# Patient Record
Sex: MALE | Race: WHITE | NOT HISPANIC OR LATINO | Employment: OTHER | URBAN - METROPOLITAN AREA
[De-identification: names, ages, dates, MRNs, and addresses within clinical notes are randomized per-mention and may not be internally consistent; named-entity substitution may affect disease eponyms.]

---

## 2017-09-27 ENCOUNTER — ALLSCRIPTS OFFICE VISIT (OUTPATIENT)
Dept: OTHER | Facility: OTHER | Age: 72
End: 2017-09-27

## 2017-09-27 DIAGNOSIS — L98.9 DISORDER OF SKIN OR SUBCUTANEOUS TISSUE: ICD-10-CM

## 2017-10-27 NOTE — PROGRESS NOTES
Assessment  1  Skin lesion (709 9) (L98 9)   2  Seborrheic keratosis (702 19) (L82 1)   3  Screening for skin condition (V82 0) (Z13 89)   4  History of nonmelanoma skin cancer (V10 83) (Z85 828)    Plan   · Follow-up visit in 1 year Evaluation and Treatment  Follow-up  Status: Hold For -  Scheduling  Requested for: [id number]   · Use a sun block product with an SPF of 15 or more ; Status:Complete;   Done:  89YZH9326   · Wound care as instructed ; Status:Complete;   Done: 05BYT8957    Discussion/Summary  Discussion Summary- [facility]:   Assessment #1: seborrheic keratosis  Care Plan:   Patient reassured these are normal growths we acquire with age no treatment needed  Assessment #2: History skin cancer  Care Plan:   No recurrence nothing else atypical noted sunblock recommended followup in 1 year  Assessment #3: Screening for dermatologic disorders  Care Plan:   Nothing else noted on exam followup in 1 year  Assessment #4: Skin lesion  Care Plan:   Possible basal cell carcinoma we'll plan on complete excision of biopsy if possible  Chief Complaint  Chief Complaint Free Text Note Form: yearly full body skin cancer exam      History of Present Illness  HPI: 26-year-old male presents for overall skin check concern regarding previous history of skin cancer as well as a nonhealing lesion on his right lateral nasal wall      Review of Systems  Complete Male D[facility]at[facility] Patient:   Constitutional: Denies constitutional symptoms  Eyes: Denies eye symptoms  ENT:  denies ear symptoms, nasal symptoms, mouth or throat symptoms  Cardiovascular: Denies cardiovascular symptoms  Respiratory: Denies respiratory symptoms  Gastrointestinal: Denies gastrointestinal symptoms  Musculoskeletal: Denies musculoskeletal symptoms  Integumentary: Denies skin, hair and nail symptoms  Neurological: Denies neurologic symptoms  Psychiatric: Denies psychiatric symptoms     Endocrine: Denies endocrine symptoms  Hematologic/Lymphatic: Denies hematologic symptoms  Active Problems  1  Hypercholesterolemia (272 0) (E78 00)   2  Hypertension (401 9) (I10)   3  Inflamed seborrheic keratosis (702 11) (L82 0)   4  Screening for skin condition (V82 0) (Z13 89)   5  Seborrheic keratosis (702 19) (L82 1)   6  Seborrheic keratosis (702 19) (L82 1)   7  Skin lesion (709 9) (L98 9)    Past Medical History  1  History of Basal cell carcinoma of face (173 31) (C44 310)   2  History of benign neoplasm of skin (V13 3) (Z87 2)   3  History of nonmelanoma skin cancer (V10 83) (Z85 828)   4  History of Prostate Cancer (V10 46)   5  History of Skin Color & Pigmentation (709 00)  Past Medical History Reviewed- Derm:   The past medical history was reviewed  Surgical History  1  History of Excision Of Lesion Face Malignant   2  History of Excision Of Lesion Nose Malignant  Surgical History Reviewed [facility]- Derm:   Surgical History reviewed      Family History  Brother    1  Family history of Prostate cancer  Family History Reviewed- Derm:   Family History was reviewed      Social History   · Never A Smoker  Social History Reviewed [facility]- Derm: The social history was reviewed      Current Meds   1  MetFORMIN HCl TABS; Therapy: (Recorded:47Mhl2118) to Recorded   2  Multi-Vitamin TABS; Therapy: (Recorded:75Spt7615) to Recorded   3  Psyllium CAPS; Therapy: (Recorded:75Gvi4986) to Recorded   4  Simvastatin 5 MG Oral Tablet; TAKE 1 TABLET DAILY; Therapy: [id number] to Recorded  Medication List Reviewed: The medication list was reviewed and updated today  Allergies  1  Benicar   2  Sulfa Drugs    Physical Exam    Constitutional   General appearance: Appears healthy and well developed  Lymphatic   No visible disturbance  Musculoskeletal   Digits and nails: No clubbing, cyanosis or edema   Cutaneous and nail exam normal     Skin   Scalp skin texture and hair distribution: Normal skin texture on scalp, normal hair distribution  Head: Abnormal     Neck: Normal turgor, no rashes, no lesions  Chest: Normal turgor, no rashes, no lesions  Abdomen: Normal turgor, no rashes, no lesions  Back: Normal turgor, no rashes, no lesions  Right upper extremity: Normal turgor, no rashes, no lesions  Left upper extremity: Normal turgor, no rashes, no lesions  Right lower extremity: Normal turgor, no rashes, no lesions  Left lower extremity: Normal turgor, no rashes, no lesions  Neuro/Psych   Alert and oriented x 3  Displays comfort and cooperation during encounterl  Affect is normal     Finding 3mm keratotic papule R lateral nasal wallprevious sites of skin cancer well-healed without recurrence normal keratotic papules with greasy stuck on appearance nothing else atypical noted on complete exam       Procedure    Procedure: skin biopsy  Indications for the procedure include basal cell carcinoma suspected  Risks, benefits, alternatives, infection risk, bleeding risk, risk of allergic reaction and risk of scarring were discussed with the patient--   verbal consent was obtained prior to the procedure  Procedure Note:   Anesthesia: 1 ml of lidocaine 1% without epinephrine  The lesion was located on the Right lateral nasal wall  The patient was prepped using alcohol  a shave biopsy of the lesion was taken  The hemostasis of the wound was achieved with aluminum chloride  Dressing: The wound was cleaned and petroleum jelly was applied and a sterile dressing was placed  Specimen: the excised lesion was place in buffered formalin and sent for pathology  Post-Procedure:   Patient Status: the patient tolerated the procedure well  Complications: there were no complications  Follow-up in the office  patient will be called in event skin cancer is found  -- Patient can call the office in 7-10 days for results if not contacted        Signatures   Electronically signed by : GILMAR Chavez ; Sep 27 2017 11: 20AM EST                       (Author)

## 2017-11-21 ENCOUNTER — GENERIC CONVERSION - ENCOUNTER (OUTPATIENT)
Dept: OTHER | Facility: OTHER | Age: 72
End: 2017-11-21

## 2018-05-21 ENCOUNTER — OFFICE VISIT (OUTPATIENT)
Dept: DERMATOLOGY | Facility: CLINIC | Age: 73
End: 2018-05-21
Payer: MEDICARE

## 2018-05-21 DIAGNOSIS — L82.1 SEBORRHEIC KERATOSIS: ICD-10-CM

## 2018-05-21 DIAGNOSIS — Z13.89 SCREENING FOR SKIN CONDITION: Primary | ICD-10-CM

## 2018-05-21 DIAGNOSIS — Z85.828 HISTORY OF SKIN CANCER: ICD-10-CM

## 2018-05-21 PROCEDURE — 99213 OFFICE O/P EST LOW 20 MIN: CPT | Performed by: DERMATOLOGY

## 2018-05-21 RX ORDER — MULTIVITAMIN
TABLET ORAL
COMMUNITY

## 2018-05-21 RX ORDER — AMLODIPINE BESYLATE 2.5 MG/1
10 TABLET ORAL DAILY
COMMUNITY

## 2018-05-21 RX ORDER — SIMVASTATIN 5 MG
1 TABLET ORAL DAILY
COMMUNITY
Start: 2014-03-07

## 2018-05-21 NOTE — PROGRESS NOTES
[address] [facility] [address] [facility]  [address] [address]     MRN: [id number] : 1945  Encounter: [id number]  Patient Information: Corin Barnett  Chief complaint:Six-month check    History of present illness:  75-year-old male with previous history skin cancer presents for overall checkup no specific concerns noted  Past Medical History:   Diagnosis Date    Diabetes ([facility] [address] )     Hyperlipemia     Hypertension      History reviewed  No pertinent surgical history  Social History   History   Alcohol Use    1 2 oz/week    1 Glasses of wine, 1 Cans of beer per week     Comment: occasionally      History   Drug Use No     History   Smoking Status    Never Smoker   Smokeless Tobacco    Never Used     Family History   Problem Relation Age of Onset    Hypertension Mother     Diabetes Mother     Leukemia Father      Meds/Allergies   Allergies   Allergen Reactions    Bee Venom     Olmesartan     Sulfa Antibiotics        Meds:  Prior to Admission medications    Medication Sig Start Date End Date Taking?  Authorizing Provider   amLODIPine (NORVASC) 2 5 mg tablet Take 5 mg by mouth daily   Yes Historical Provider, MD   metFORMIN (GLUCOPHAGE) 1000 MG tablet Take by mouth   Yes Historical Provider, MD   Multiple Vitamin (MULTI-VITAMIN DAILY) TABS Take by mouth   Yes Historical Provider, MD   simvastatin (ZOCOR) 5 MG tablet Take 1 tablet by mouth daily 3/7/14  Yes Historical Provider, MD   Psyllium 400 MG CAPS Take by mouth    Historical Provider, MD       Subjective:     Review of Systems:    General: negative for - chills, fatigue, fever,  weight gain or weight loss  Psychological: negative for - anxiety, behavioral disorder, concentration difficulties, decreased libido, depression, irritability, memory difficulties, mood swings, sleep disturbances or suicidal ideation  ENT: negative for - hearing difficulties , nasal congestion, nasal discharge, oral lesions, sinus pain, sneezing, sore throat  Allergy and Immunology: negative for - hives, insect bite sensitivity,  Hematological and Lymphatic: negative for - bleeding problems, blood clots,bruising, swollen lymph nodes  Endocrine: negative for - hair pattern changes, hot flashes, malaise/lethargy, mood swings, palpitations, polydipsia/polyuria, skin changes, temperature intolerance or unexpected weight change  Respiratory: negative for - cough, hemoptysis, orthopnea, shortness of breath, or wheezing  Cardiovascular: negative for - chest pain, dyspnea on exertion, edema,  Gastrointestinal: negative for - abdominal pain, nausea/vomiting  Genito-Urinary: negative for - dysuria, incontinence, irregular/heavy menses or urinary frequency/urgency  Musculoskeletal: negative for - gait disturbance, joint pain, joint stiffness, joint swelling, muscle pain, muscular weakness  Dermatological:  As in HPI  Neurological: negative for confusion, dizziness, headaches, impaired coordination/balance, memory loss, numbness/tingling, seizures, speech problems, tremors or weakness       Objective: There were no vitals taken for this visit  Physical Exam:    General Appearance:    Alert, cooperative, no distress   Head:    Normocephalic, without obvious abnormality, atraumatic           Skin:   A full skin exam was performed including scalp, head scalp, eyes, ears, nose, lips, neck, chest, axilla, abdomen, back, buttocks, bilateral upper extremities, bilateral lower extremities, hands, feet, fingers, toes, fingernails, and toenails  Normal keratotic papules with greasy stuck on appearance previous sites of skin cancer well healed without recurrence nothing else atypical noted exam     Assessment:     1  Screening for skin condition     2  History of skin cancer     3   Seborrheic keratosis           Plan:   Seborrheic keratosis patient reassured these are normal growths we acquire with age no treatment needed  History of skin cancer in no recurrence nothing else atypical sunblock recommended follow-up in 6 months  Screening for dermatologic disorders nothing else of concern noted on complete exam follow-up in 6 months    Tangela Quesada MD  5/21/2018,11:19 AM    Portions of the record may have been created with voice recognition software   Occasional wrong word or "sound a like" substitutions may have occurred due to the inherent limitations of voice recognition software   Read the chart carefully and recognize, using context, where substitutions have occurred

## 2018-05-21 NOTE — PATIENT INSTRUCTIONS
Seborrheic keratosis patient reassured these are normal growths we acquire with age no treatment needed  History of skin cancer in no recurrence nothing else atypical sunblock recommended follow-up in 6 months  Screening for dermatologic disorders nothing else of concern noted on complete exam follow-up in 6 months

## 2018-11-20 ENCOUNTER — OFFICE VISIT (OUTPATIENT)
Dept: DERMATOLOGY | Facility: CLINIC | Age: 73
End: 2018-11-20
Payer: MEDICARE

## 2018-11-20 DIAGNOSIS — Z13.89 SCREENING FOR SKIN CONDITION: ICD-10-CM

## 2018-11-20 DIAGNOSIS — L82.1 SEBORRHEIC KERATOSIS: ICD-10-CM

## 2018-11-20 DIAGNOSIS — Z85.828 HISTORY OF SKIN CANCER: ICD-10-CM

## 2018-11-20 DIAGNOSIS — L98.9 UNKNOWN SKIN LESION: ICD-10-CM

## 2018-11-20 DIAGNOSIS — L81.9 CHANGING PIGMENTED SKIN LESION: Primary | ICD-10-CM

## 2018-11-20 PROCEDURE — 88305 TISSUE EXAM BY PATHOLOGIST: CPT | Performed by: PATHOLOGY

## 2018-11-20 PROCEDURE — 11101 PR BIOPSY, EACH ADDED LESION: CPT | Performed by: DERMATOLOGY

## 2018-11-20 PROCEDURE — 11100 PR BIOPSY OF SKIN LESION: CPT | Performed by: DERMATOLOGY

## 2018-11-20 PROCEDURE — 99213 OFFICE O/P EST LOW 20 MIN: CPT | Performed by: DERMATOLOGY

## 2018-11-20 NOTE — PATIENT INSTRUCTIONS
Changing pigmented lesion weight results of biopsy if further treatment needed  Probable basal cell on the back plan on curettage 5  Seborrheic keratosis patient reassured these are normal growths we acquire with age no treatment needed  History of skin cancer in no recurrence nothing else atypical sunblock recommended follow-up in 6 months  Screening for dermatologic disorders nothing else of concern noted on complete exam follow-up in 6 months  Wound care instructions given to patient

## 2018-11-20 NOTE — PROGRESS NOTES
[address]  [address]  [place]  [phone]  [phone]     MRN: [id number] : 1945  Encounter: [id number]  Patient Information: Renzo Santoyo  Chief complaint:  6 month skin check    History of present illness:  40-year-old male presents for overall skin check previous history of a skin cancer recently lost his wife from  a glioblastoma  No specific concerns noted  Past Medical History:   Diagnosis Date    [facility] ([facility] [address] )     Hyperlipemia     Hypertension      No past surgical history on file  Social History   History   Alcohol Use    1 2 oz/week    1 Glasses of wine, 1 Cans of beer per week     Comment: occasionally      History   Drug Use No     History   Smoking Status    Never Smoker   Smokeless Tobacco    Never Used     Family History   Problem Relation Age of Onset    Hypertension Mother     Diabetes Mother     Leukemia Father      Meds/Allergies   Allergies   Allergen Reactions    Bee Venom     Olmesartan     Sulfa Antibiotics        Meds:  Prior to Admission medications    Medication Sig Start Date End Date Taking?  Authorizing Provider   amLODIPine (NORVASC) 2 5 mg tablet Take 5 mg by mouth daily   Yes Historical Provider, MD   metFORMIN (GLUCOPHAGE) 1000 MG tablet Take by mouth   Yes Historical Provider, MD   Multiple Vitamin (MULTI-VITAMIN DAILY) TABS Take by mouth   Yes Historical Provider, MD   simvastatin (ZOCOR) 5 MG tablet Take 1 tablet by mouth daily 3/7/14  Yes Historical Provider, MD   Psyllium 400 MG CAPS Take by mouth    Historical Provider, MD       Subjective:     Review of Systems:    General: negative for - chills, fatigue, fever,  weight gain or weight loss  Psychological: negative for - anxiety, behavioral disorder, concentration difficulties, decreased libido, depression, irritability, memory difficulties, mood swings, sleep disturbances or suicidal ideation  ENT: negative for - hearing difficulties , nasal congestion, nasal discharge, oral lesions, sinus pain, sneezing, sore throat  Allergy and Immunology: negative for - hives, insect bite sensitivity,  Hematological and Lymphatic: negative for - bleeding problems, blood clots,bruising, swollen lymph nodes  Endocrine: negative for - hair pattern changes, hot flashes, malaise/lethargy, mood swings, palpitations, polydipsia/polyuria, skin changes, temperature intolerance or unexpected weight change  Respiratory: negative for - cough, hemoptysis, orthopnea, shortness of breath, or wheezing  Cardiovascular: negative for - chest pain, dyspnea on exertion, edema,  Gastrointestinal: negative for - abdominal pain, nausea/vomiting  Genito-Urinary: negative for - dysuria, incontinence, irregular/heavy menses or urinary frequency/urgency  Musculoskeletal: negative for - gait disturbance, joint pain, joint stiffness, joint swelling, muscle pain, muscular weakness  Dermatological:  As in HPI  Neurological: negative for confusion, dizziness, headaches, impaired coordination/balance, memory loss, numbness/tingling, seizures, speech problems, tremors or weakness       Objective: There were no vitals taken for this visit      Physical Exam:    General Appearance:    Alert, cooperative, no distress   Head:    Normocephalic, without obvious abnormality, atraumatic           Skin:   A full skin exam was performed including scalp, head scalp, eyes, ears, nose, lips, neck, chest, axilla, abdomen, back, buttocks, bilateral upper extremities, bilateral lower extremities, hands, feet, fingers, toes, fingernails, and toenails 6 mm pigmented macule noted on the right temple 4 mm pearly macule noted on right back nothing else remarkable noted exam normal keratotic papules greasy stuck on appearance previous sites of skin cancer well healed without recurrence nothing else atypical noted exam        Shave Biopsy Procedure Note    Pre-operative Diagnosis:  Lentigo rule out atypia right temple question basal cell carcinoma    Plan:  1  Instructed to keep the wound dry and covered for 24 and clean thereafter  2  Warning signs of infection were reviewed  3  Recommended that the patient use OTC acetaminophen as needed for pain  4  Return  Pending results of biopsy(ies)    Locations:  Right temple and back respectively    Indications:  Suspicious lesion    Anesthesia: Lidocaine 1% without epinephrine without added sodium bicarbonate    Procedure Details     Patient informed of the risks (including bleeding and infection) and benefits of the   procedure and Verbal informed consent obtained  The lesion and surrounding area were given a sterile prep using alcohol and draped in the usual sterile fashion  A Blue blade razor was used to obtain a specimen  Hemostasis achieved with aluminum chloride  Petrolatum and a sterile dressing applied  The specimen was sent for pathologic examination  The patient tolerated the procedure(s) well  Complications:  none  Assessment:     1  Changing pigmented skin lesion     2  Unknown skin lesion     3  Seborrheic keratosis     4  Screening for skin condition     5   History of skin cancer           Plan:   Changing pigmented lesion weight results of biopsy if further treatment needed  Probable basal cell on the back plan on curettage 5  Seborrheic keratosis patient reassured these are normal growths we acquire with age no treatment needed  History of skin cancer in no recurrence nothing else atypical sunblock recommended follow-up in 6 months  Screening for dermatologic disorders nothing else of concern noted on complete exam follow-up in 6 months    Lesa Rinaldi MD  11/20/2018,11:28 AM    Portions of the record may have been created with voice recognition software   Occasional wrong word or "sound a like" substitutions may have occurred due to the inherent limitations of voice recognition software   Read the chart carefully and recognize, using context, where substitutions have occurred

## 2018-12-06 ENCOUNTER — PROCEDURE VISIT (OUTPATIENT)
Dept: DERMATOLOGY | Facility: CLINIC | Age: 73
End: 2018-12-06
Payer: MEDICARE

## 2018-12-06 DIAGNOSIS — L57.0 ACTINIC KERATOSIS: ICD-10-CM

## 2018-12-06 DIAGNOSIS — C44.519 BASAL CELL CARCINOMA (BCC) OF BACK: Primary | ICD-10-CM

## 2018-12-06 PROCEDURE — 17000 DESTRUCT PREMALG LESION: CPT | Performed by: DERMATOLOGY

## 2018-12-06 PROCEDURE — 17261 DSTRJ MAL LES T/A/L .6-1.0CM: CPT | Performed by: DERMATOLOGY

## 2018-12-06 NOTE — PROGRESS NOTES
[address]  [address]  [facility] [address]  [phone]  [phone]     MRN: [id number] : 1945  Encounter: [id number]  Patient Information: Edgar Davis    Subjective:     58-year-old male presents for follow-up for previously biopsied basal cell carcinoma right back and actinic keratosis right temple and planned treatment  Objective: There were no vitals taken for this visit  Physical Exam:    General Appearance:    Alert, cooperative, no distress   Skin:   Previous sites of biopsy noted  Procedure: Curettage & Electrodessication of basal cell carcinoma  The reasons for the procedure were explained to the patient  The benefits and risks of the procedure were explained to the patient, including bleeding, infection, incomplete removal, prolonged anesthesia (weeks to months) and rarely nerve damage  The patient is aware that a scar will result from the procedure  The consent for the procedure was obtained verbally and in writing  Lesion Site:  right back Curetted Area Size (mm): 6    Using a sterile curette, the appropriate area was curetted  The area was curetted and electrodesiccated x3  Final defect size: 7mm    The wound was left to heal by secondary intention  The wound was cleansed then covered with a dressing  Wound care instructions were verbally given and in writing  I performed the entire procedure  Patient tolerated procedure well  Cryotherapy Procedure Note    Pre-operative Diagnosis: actinic keratosis    Plan:  1  Instructed to keep the area dry and clean thereafter  Apply petrolatum if area gets crusty  2  Recommended that the patient use acetaminophen  as needed for pain       Locations:  Right temple    Indications: Destruction of actinic keratosis x1    Patient informed of risks (permanent scarring, infection, light or dark discoloration, bleeding, infection, weakness, numbness and recurrence of the lesion) and benefits of the procedure and verbal informed consent obtained  The areas are treated with liquid nitrogen therapy, frozen until ice ball extended 2 mm beyond lesion, allowed to thaw, and treated again  The patient tolerated procedure well  The patient was instructed on post-op care, warned that there may be blister formation, redness and pain  Recommend OTC analgesia as needed for pain  Condition:  Stable    Complications:  none  Assessment:     1  Basal cell carcinoma (BCC) of back     2  Actinic keratosis           Plan:   Wound care instructions given to patient  Actinic Keratosis:  Patient advised lesions are precancers  These should resolve with cryosurgery if not to let us know sun block recommended  Prior to Admission medications    Medication Sig Start Date End Date Taking? Authorizing Provider   amLODIPine (NORVASC) 2 5 mg tablet Take 5 mg by mouth daily    Historical Provider, MD   metFORMIN (GLUCOPHAGE) 1000 MG tablet Take by mouth    Historical Provider, MD   Multiple Vitamin (MULTI-VITAMIN DAILY) TABS Take by mouth    Historical Provider, MD   Psyllium 400 MG CAPS Take by mouth    Historical Provider, MD   simvastatin (ZOCOR) 5 MG tablet Take 1 tablet by mouth daily 3/7/14   Historical Provider, MD     Allergies   Allergen Reactions    Bee Venom     Olmesartan     Sulfa Antibiotics        Vinayak Celis MD  12/6/2018,2:26 PM    Portions of the record may have been created with voice recognition software   Occasional wrong word or "sound a like" substitutions may have occurred due to the inherent limitations of voice recognition software   Read the chart carefully and recognize, using context, where substitutions have occurred

## 2018-12-06 NOTE — PATIENT INSTRUCTIONS
Wound care instructions given to patient  Actinic Keratosis:  Patient advised lesions are precancers  These should resolve with cryosurgery if not to let us know sun block recommended  Treatment with Cryotherapy    The doctor has treated your skin with nitrogen, which is 320 degrees Fahrenheit below zero  He has given the treated area "frostbite "    Stinging should subside within a few hours  You can take Tylenol for pain, if needed  Over the next few days, it is normal if the area becomes reddened, a blood blister, or swollen with fluid  If the lesion treated was near the eye - you could get a swollen eye over the next few days  Do not panic! This is all temporary, and will resolve with time  There is no special treatment - just keep the area clean  Makeup and BandAids can be used, if preferred  When the area starts to dry up and peel off, using Vaseline can help healing  It usually takes up to a month for it to heal   Some lesions are recurrent and may require repeat treatments  If a lesion has not healed in one month, please don't hesitate to contact us  If you have any further questions that are not answered here, please call us  [phone]    Thank you for allowing us to care for you

## 2019-01-03 ENCOUNTER — CLINICAL SUPPORT (OUTPATIENT)
Dept: DERMATOLOGY | Facility: CLINIC | Age: 74
End: 2019-01-03

## 2019-01-03 DIAGNOSIS — C44.519 BASAL CELL CARCINOMA (BCC) OF LOWER BACK: Primary | ICD-10-CM

## 2019-01-03 PROCEDURE — 99024 POSTOP FOLLOW-UP VISIT: CPT | Performed by: DERMATOLOGY

## 2019-01-03 NOTE — PROGRESS NOTES
[address]  [address]  [place] [address] [place] [address]  [phone]  [phone]     MRN: [id number] : 1945  Encounter: [id number]  Patient Information: Chris Pu    Subjective:     52-year-old male presents for follow-up for previously curetted basal cell carcinoma right back     Objective: There were no vitals taken for this visit  Physical Exam:    General Appearance:    Alert, cooperative, no distress   Skin:   Previous site curettage well-healed without evidence of disease       Assessment:     1  Basal cell carcinoma (BCC) of lower back           Plan:   Previous site well-healed no signs of any residual disease follow-up as previously scheduled        Prior to Admission medications    Medication Sig Start Date End Date Taking? Authorizing Provider   amLODIPine (NORVASC) 2 5 mg tablet Take 5 mg by mouth daily    Historical Provider, MD   metFORMIN (GLUCOPHAGE) 1000 MG tablet Take by mouth    Historical Provider, MD   Multiple Vitamin (MULTI-VITAMIN DAILY) TABS Take by mouth    Historical Provider, MD   Psyllium 400 MG CAPS Take by mouth    Historical Provider, MD   simvastatin (ZOCOR) 5 MG tablet Take 1 tablet by mouth daily 3/7/14   Historical Provider, MD     Allergies   Allergen Reactions   Hugo Dang MD  1/3/2019,11:52 AM    Portions of the record may have been created with voice recognition software   Occasional wrong word or "sound a like" substitutions may have occurred due to the inherent limitations of voice recognition software   Read the chart carefully and recognize, using context, where substitutions have occurred

## 2019-05-10 ENCOUNTER — TELEPHONE (OUTPATIENT)
Dept: GASTROENTEROLOGY | Facility: CLINIC | Age: 74
End: 2019-05-10

## 2019-05-15 ENCOUNTER — OFFICE VISIT (OUTPATIENT)
Dept: DERMATOLOGY | Facility: CLINIC | Age: 74
End: 2019-05-15
Payer: MEDICARE

## 2019-05-15 DIAGNOSIS — L98.9 UNKNOWN SKIN LESION: ICD-10-CM

## 2019-05-15 DIAGNOSIS — Z85.828 HISTORY OF SKIN CANCER: ICD-10-CM

## 2019-05-15 DIAGNOSIS — Z13.89 SCREENING FOR SKIN CONDITION: ICD-10-CM

## 2019-05-15 DIAGNOSIS — L82.1 SEBORRHEIC KERATOSIS: Primary | ICD-10-CM

## 2019-05-15 PROCEDURE — 1160F RVW MEDS BY RX/DR IN RCRD: CPT | Performed by: DERMATOLOGY

## 2019-05-15 PROCEDURE — 11102 TANGNTL BX SKIN SINGLE LES: CPT | Performed by: DERMATOLOGY

## 2019-05-15 PROCEDURE — 88305 TISSUE EXAM BY PATHOLOGIST: CPT | Performed by: PATHOLOGY

## 2019-05-15 PROCEDURE — 99213 OFFICE O/P EST LOW 20 MIN: CPT | Performed by: DERMATOLOGY

## 2019-05-22 ENCOUNTER — TELEPHONE (OUTPATIENT)
Dept: DERMATOLOGY | Facility: CLINIC | Age: 74
End: 2019-05-22

## 2019-06-12 ENCOUNTER — ANESTHESIA EVENT (OUTPATIENT)
Dept: GASTROENTEROLOGY | Facility: HOSPITAL | Age: 74
End: 2019-06-12

## 2019-06-13 ENCOUNTER — ANESTHESIA (OUTPATIENT)
Dept: GASTROENTEROLOGY | Facility: HOSPITAL | Age: 74
End: 2019-06-13

## 2019-06-13 ENCOUNTER — HOSPITAL ENCOUNTER (OUTPATIENT)
Dept: GASTROENTEROLOGY | Facility: HOSPITAL | Age: 74
Setting detail: OUTPATIENT SURGERY
Discharge: HOME | End: 2019-06-13
Attending: INTERNAL MEDICINE | Admitting: INTERNAL MEDICINE
Payer: MEDICARE

## 2019-06-13 VITALS
WEIGHT: 169.97 LBS | DIASTOLIC BLOOD PRESSURE: 92 MMHG | SYSTOLIC BLOOD PRESSURE: 144 MMHG | TEMPERATURE: 97.1 F | BODY MASS INDEX: 25.18 KG/M2 | OXYGEN SATURATION: 94 % | RESPIRATION RATE: 22 BRPM | HEIGHT: 69 IN | HEART RATE: 68 BPM

## 2019-06-13 DIAGNOSIS — Z12.11 SCREENING FOR COLON CANCER: ICD-10-CM

## 2019-06-13 DIAGNOSIS — Z86.010 HISTORY OF COLON POLYPS: ICD-10-CM

## 2019-06-13 LAB — GLUCOSE SERPL-MCNC: 93 MG/DL (ref 65–140)

## 2019-06-13 PROCEDURE — 45385 COLONOSCOPY W/LESION REMOVAL: CPT | Performed by: INTERNAL MEDICINE

## 2019-06-13 PROCEDURE — 88305 TISSUE EXAM BY PATHOLOGIST: CPT | Performed by: PATHOLOGY

## 2019-06-13 PROCEDURE — 82948 REAGENT STRIP/BLOOD GLUCOSE: CPT

## 2019-06-13 RX ORDER — SODIUM CHLORIDE, SODIUM LACTATE, POTASSIUM CHLORIDE, CALCIUM CHLORIDE 600; 310; 30; 20 MG/100ML; MG/100ML; MG/100ML; MG/100ML
125 INJECTION, SOLUTION INTRAVENOUS CONTINUOUS
Status: DISCONTINUED | OUTPATIENT
Start: 2019-06-13 | End: 2019-06-17 | Stop reason: HOSPADM

## 2019-06-13 RX ORDER — LIDOCAINE HYDROCHLORIDE 10 MG/ML
INJECTION, SOLUTION INFILTRATION; PERINEURAL AS NEEDED
Status: DISCONTINUED | OUTPATIENT
Start: 2019-06-13 | End: 2019-06-13 | Stop reason: SURG

## 2019-06-13 RX ORDER — PROPOFOL 10 MG/ML
INJECTION, EMULSION INTRAVENOUS AS NEEDED
Status: DISCONTINUED | OUTPATIENT
Start: 2019-06-13 | End: 2019-06-13 | Stop reason: SURG

## 2019-06-13 RX ADMIN — PROPOFOL 30 MG: 10 INJECTION, EMULSION INTRAVENOUS at 07:19

## 2019-06-13 RX ADMIN — PROPOFOL 30 MG: 10 INJECTION, EMULSION INTRAVENOUS at 07:22

## 2019-06-13 RX ADMIN — PROPOFOL 170 MG: 10 INJECTION, EMULSION INTRAVENOUS at 07:17

## 2019-06-13 RX ADMIN — SODIUM CHLORIDE, SODIUM LACTATE, POTASSIUM CHLORIDE, AND CALCIUM CHLORIDE: .6; .31; .03; .02 INJECTION, SOLUTION INTRAVENOUS at 06:30

## 2019-06-13 RX ADMIN — LIDOCAINE HYDROCHLORIDE 50 MG: 10 INJECTION, SOLUTION INFILTRATION; PERINEURAL at 07:17

## 2019-06-13 RX ADMIN — PROPOFOL 30 MG: 10 INJECTION, EMULSION INTRAVENOUS at 07:25

## 2019-11-19 ENCOUNTER — OFFICE VISIT (OUTPATIENT)
Dept: DERMATOLOGY | Facility: CLINIC | Age: 74
End: 2019-11-19
Payer: MEDICARE

## 2019-11-19 DIAGNOSIS — L82.1 SEBORRHEIC KERATOSIS: ICD-10-CM

## 2019-11-19 DIAGNOSIS — Z85.828 HISTORY OF SKIN CANCER: ICD-10-CM

## 2019-11-19 DIAGNOSIS — L98.9 UNKNOWN SKIN LESION: ICD-10-CM

## 2019-11-19 DIAGNOSIS — L23.9 ALLERGIC CONTACT DERMATITIS, UNSPECIFIED TRIGGER: ICD-10-CM

## 2019-11-19 DIAGNOSIS — Z13.89 SCREENING FOR SKIN CONDITION: Primary | ICD-10-CM

## 2019-11-19 PROCEDURE — 88305 TISSUE EXAM BY PATHOLOGIST: CPT | Performed by: STUDENT IN AN ORGANIZED HEALTH CARE EDUCATION/TRAINING PROGRAM

## 2019-11-19 PROCEDURE — 11103 TANGNTL BX SKIN EA SEP/ADDL: CPT | Performed by: DERMATOLOGY

## 2019-11-19 PROCEDURE — 99213 OFFICE O/P EST LOW 20 MIN: CPT | Performed by: DERMATOLOGY

## 2019-11-19 PROCEDURE — 11102 TANGNTL BX SKIN SINGLE LES: CPT | Performed by: DERMATOLOGY

## 2019-11-19 RX ORDER — FLUOCINONIDE 0.5 MG/G
OINTMENT TOPICAL 2 TIMES DAILY
Qty: 30 G | Refills: 1 | Status: SHIPPED | OUTPATIENT
Start: 2019-11-19 | End: 2020-06-11

## 2019-11-19 NOTE — PATIENT INSTRUCTIONS
Skin lesions question irritated keratosis rule out basal cell carcinoma await results of biopsy area on the clavicle would require excision  Contact reaction question irritant related at this point from all the topical agents pedro has been placing on the area  go ahead treat with topical steroids and hopefully will resolve shortly  Seborrheic keratosis patient reassured these are normal growths we acquire with age no treatment needed  History of skin cancer in no recurrence nothing else atypical sunblock recommended follow-up in 6 months  Screening for dermatologic disorders nothing else of concern noted on complete exam follow-up in 6 months

## 2019-11-19 NOTE — PROGRESS NOTES
[address] [facility] DERMATOLOGY  [address] [place] [address]  [place] [address]  [phone]  [phone]     MRN: [id number] : 1945  Encounter: [id number]  Patient Information: Josh Mac  Chief complaint:  Six-month checkup    History of present illness:  64-year-old male with previous history skin cancer presents for overall checkup concerned regarding growth behind his left ear as well as 1 on his right clavicle that appears to be slowly getting larger and concerns regarding them also concerned regarding potential skin cancer  Patient with the contact type reaction that occurred about a month ago has been pretty different things on including calamine lotion areas have not healed  Past Medical History:   Diagnosis Date    Colon polyp     Diabetes ([address] )     Hyperlipemia     Hypertension      Past Surgical History:   Procedure Laterality Date    ELBOW SURGERY Left     PARATHYROIDECTOMY      TONSILECTOMY AND ADNOIDECTOMY      TONSILLECTOMY       Social History   Social History     Substance and Sexual Activity   Alcohol Use Yes    Alcohol/week: 2 0 standard drinks    Types: 1 Glasses of wine, 1 Cans of beer per week    Frequency: 2-4 times a month    Drinks per session: 1 or 2    Binge frequency: Never    Comment: occasionally      Social History     Substance and Sexual Activity   Drug Use No     Social History     Tobacco Use   Smoking Status Never Smoker   Smokeless Tobacco Never Used     Family History   Problem Relation Age of Onset    Hypertension Mother     Diabetes Mother     Leukemia Father      Meds/Allergies   Allergies   Allergen Reactions    Bee Venom      Yellow-jacket and hornets    Olmesartan     Sulfa Antibiotics        Meds:  Prior to Admission medications    Medication Sig Start Date End Date Taking?  Authorizing Provider   amLODIPine (NORVASC) 2 5 mg tablet Take 10 mg by mouth daily    Yes Historical Provider, MD   metFORMIN (GLUCOPHAGE) 1000 MG tablet Take by mouth   Yes Historical Provider, MD   Multiple Vitamin (MULTI-VITAMIN DAILY) TABS Take by mouth   Yes Historical Provider, MD   simvastatin (ZOCOR) 5 MG tablet Take 1 tablet by mouth daily 3/7/14  Yes Historical Provider, MD       Subjective:     Review of Systems:    General: negative for - chills, fatigue, fever,  weight gain or weight loss  Psychological: negative for - anxiety, behavioral disorder, concentration difficulties, decreased libido, depression, irritability, memory difficulties, mood swings, sleep disturbances or suicidal ideation  ENT: negative for - hearing difficulties , nasal congestion, nasal discharge, oral lesions, sinus pain, sneezing, sore throat  Allergy and Immunology: negative for - hives, insect bite sensitivity,  Hematological and Lymphatic: negative for - bleeding problems, blood clots,bruising, swollen lymph nodes  Endocrine: negative for - hair pattern changes, hot flashes, malaise/lethargy, mood swings, palpitations, polydipsia/polyuria, skin changes, temperature intolerance or unexpected weight change  Respiratory: negative for - cough, hemoptysis, orthopnea, shortness of breath, or wheezing  Cardiovascular: negative for - chest pain, dyspnea on exertion, edema,  Gastrointestinal: negative for - abdominal pain, nausea/vomiting  Genito-Urinary: negative for - dysuria, incontinence, irregular/heavy menses or urinary frequency/urgency  Musculoskeletal: negative for - gait disturbance, joint pain, joint stiffness, joint swelling, muscle pain, muscular weakness  Dermatological:  As in HPI  Neurological: negative for confusion, dizziness, headaches, impaired coordination/balance, memory loss, numbness/tingling, seizures, speech problems, tremors or weakness       Objective: There were no vitals taken for this visit      Physical Exam:    General Appearance:    Alert, cooperative, no distress   Head:    Normocephalic, without obvious abnormality, atraumatic Skin:   A full skin exam was performed including scalp, head scalp, eyes, ears, nose, lips, neck, chest, axilla, abdomen, back, buttocks, bilateral upper extremities, bilateral lower extremities, hands, feet, fingers, toes, fingernails, and toenails 1 1 cm pearly nodule noted on the right clavicle 6 mm keratotic papule noted on left postauricular area normal keratotic papules with greasy stuck on appearance previous site of skin cancer well healed without recurrence nothing else remarkable noted on complete exam except for the scaling erythematous area noted on the left chest and so a little bit on the right leg and left thigh which is pretty well-demarcated          Shave Biopsy Procedure Note    Pre-operative Diagnosis: neural nevus versus basal cell carcinoma right clavicle irritated keratosis rule out atypia    Plan:  1  Instructed to keep the wound dry and covered for 24 and clean thereafter  2  Warning signs of infection were reviewed  3  Recommended that the patient use OTC acetaminophen as needed for pain  4  Return  Pending results of biopsy(ies)    Locations:  Right clavicle left postauricular neck    Indications:  Irritated lesions    Anesthesia: Lidocaine 1% without epinephrine without added sodium bicarbonate    Procedure Details     Patient informed of the risks (including bleeding and infection) and benefits of the   procedure and Verbal informed consent obtained  The lesion and surrounding area were given a sterile prep using alcohol and draped in the usual sterile fashion  A Blue blade razor was used to obtain a specimen  Hemostasis achieved with aluminum chloride  Petrolatum and a sterile dressing applied  The specimen was sent for pathologic examination  The patient tolerated the procedure(s) well  Complications:  none  Assessment:     1  Screening for skin condition     2  Allergic contact dermatitis, unspecified trigger     3  History of skin cancer     4   Seborrheic keratosis     5  Unknown skin lesion           Plan:   Skin lesions question irritated keratosis rule out basal cell carcinoma await results of biopsy area on the clavicle would require excision  Contact reaction question irritant related at this point from all the topical agents pedro has been placing on the area  go ahead treat with topical steroids and hopefully will resolve shortly  Seborrheic keratosis patient reassured these are normal growths we acquire with age no treatment needed  History of skin cancer in no recurrence nothing else atypical sunblock recommended follow-up in 6 months  Screening for dermatologic disorders nothing else of concern noted on complete exam follow-up in 6 months    Michae Bernheim, MD  11/19/2019,3:00 PM    Portions of the record may have been created with voice recognition software   Occasional wrong word or "sound a like" substitutions may have occurred due to the inherent limitations of voice recognition software   Read the chart carefully and recognize, using context, where substitutions have occurred

## 2019-12-02 ENCOUNTER — TELEPHONE (OUTPATIENT)
Dept: DERMATOLOGY | Facility: CLINIC | Age: 74
End: 2019-12-02

## 2019-12-02 NOTE — TELEPHONE ENCOUNTER
----- Message from Benjamni Esteves MD sent at 12/2/2019  8:56 AM EST -----  Please call him of normal pathology

## 2020-05-19 ENCOUNTER — TELEMEDICINE (OUTPATIENT)
Dept: DERMATOLOGY | Facility: CLINIC | Age: 75
End: 2020-05-19
Payer: MEDICARE

## 2020-05-19 DIAGNOSIS — Z85.828 HISTORY OF SKIN CANCER: Primary | ICD-10-CM

## 2020-05-19 PROCEDURE — 99213 OFFICE O/P EST LOW 20 MIN: CPT | Performed by: DERMATOLOGY

## 2020-06-11 ENCOUNTER — HOSPITAL ENCOUNTER (EMERGENCY)
Facility: HOSPITAL | Age: 75
Discharge: HOME | End: 2020-06-11
Attending: EMERGENCY MEDICINE | Admitting: EMERGENCY MEDICINE
Payer: MEDICARE

## 2020-06-11 VITALS
TEMPERATURE: 98.5 F | OXYGEN SATURATION: 99 % | SYSTOLIC BLOOD PRESSURE: 151 MMHG | RESPIRATION RATE: 20 BRPM | HEART RATE: 80 BPM | DIASTOLIC BLOOD PRESSURE: 90 MMHG

## 2020-06-11 DIAGNOSIS — R55 VASO VAGAL EPISODE: ICD-10-CM

## 2020-06-11 DIAGNOSIS — R55 SYNCOPE: Primary | ICD-10-CM

## 2020-06-11 PROCEDURE — 93005 ELECTROCARDIOGRAM TRACING: CPT

## 2020-06-11 PROCEDURE — 99284 EMERGENCY DEPT VISIT MOD MDM: CPT

## 2020-06-11 PROCEDURE — 99284 EMERGENCY DEPT VISIT MOD MDM: CPT | Performed by: NURSE PRACTITIONER

## 2020-06-12 LAB
ATRIAL RATE: 60 BPM
P AXIS: 71 DEGREES
PR INTERVAL: 188 MS
QRS AXIS: 70 DEGREES
QRSD INTERVAL: 96 MS
QT INTERVAL: 484 MS
QTC INTERVAL: 484 MS
T WAVE AXIS: 49 DEGREES
VENTRICULAR RATE: 60 BPM

## 2020-06-12 PROCEDURE — 93010 ELECTROCARDIOGRAM REPORT: CPT | Performed by: INTERNAL MEDICINE

## 2020-12-09 ENCOUNTER — OFFICE VISIT (OUTPATIENT)
Dept: DERMATOLOGY | Facility: CLINIC | Age: 75
End: 2020-12-09
Payer: MEDICARE

## 2020-12-09 VITALS — TEMPERATURE: 97.3 F

## 2020-12-09 DIAGNOSIS — L82.1 SEBORRHEIC KERATOSIS: Primary | ICD-10-CM

## 2020-12-09 DIAGNOSIS — Z85.828 HISTORY OF SKIN CANCER: ICD-10-CM

## 2020-12-09 DIAGNOSIS — Z13.89 SCREENING FOR SKIN CONDITION: ICD-10-CM

## 2020-12-09 PROCEDURE — 99213 OFFICE O/P EST LOW 20 MIN: CPT | Performed by: DERMATOLOGY

## 2021-01-18 DIAGNOSIS — L23.9 ALLERGIC CONTACT DERMATITIS, UNSPECIFIED TRIGGER: Primary | ICD-10-CM

## 2021-01-18 RX ORDER — FLUOCINONIDE 0.5 MG/G
OINTMENT TOPICAL 2 TIMES DAILY
Qty: 30 G | Refills: 1 | Status: SHIPPED | OUTPATIENT
Start: 2021-01-18

## 2021-02-12 DIAGNOSIS — Z23 ENCOUNTER FOR IMMUNIZATION: ICD-10-CM

## 2021-03-18 ENCOUNTER — IMMUNIZATIONS (OUTPATIENT)
Dept: FAMILY MEDICINE CLINIC | Facility: HOSPITAL | Age: 76
End: 2021-03-18

## 2021-03-18 DIAGNOSIS — Z23 ENCOUNTER FOR IMMUNIZATION: Primary | ICD-10-CM

## 2021-03-18 PROCEDURE — 91300 SARS-COV-2 / COVID-19 MRNA VACCINE (PFIZER-BIONTECH) 30 MCG: CPT

## 2021-03-18 PROCEDURE — 0001A SARS-COV-2 / COVID-19 MRNA VACCINE (PFIZER-BIONTECH) 30 MCG: CPT

## 2021-04-08 ENCOUNTER — IMMUNIZATIONS (OUTPATIENT)
Dept: FAMILY MEDICINE CLINIC | Facility: HOSPITAL | Age: 76
End: 2021-04-08

## 2021-04-08 DIAGNOSIS — Z23 ENCOUNTER FOR IMMUNIZATION: Primary | ICD-10-CM

## 2021-04-08 PROCEDURE — 0002A SARS-COV-2 / COVID-19 MRNA VACCINE (PFIZER-BIONTECH) 30 MCG: CPT

## 2021-04-08 PROCEDURE — 91300 SARS-COV-2 / COVID-19 MRNA VACCINE (PFIZER-BIONTECH) 30 MCG: CPT

## 2021-06-30 ENCOUNTER — OFFICE VISIT (OUTPATIENT)
Dept: DERMATOLOGY | Facility: CLINIC | Age: 76
End: 2021-06-30
Payer: MEDICARE

## 2021-06-30 DIAGNOSIS — Z13.89 SCREENING FOR SKIN CONDITION: ICD-10-CM

## 2021-06-30 DIAGNOSIS — Q82.5 CONGENITAL NEVUS: Primary | ICD-10-CM

## 2021-06-30 DIAGNOSIS — L82.1 SEBORRHEIC KERATOSIS: ICD-10-CM

## 2021-06-30 DIAGNOSIS — Z85.828 HISTORY OF SKIN CANCER: ICD-10-CM

## 2021-06-30 DIAGNOSIS — D48.9 NEOPLASM OF UNCERTAIN BEHAVIOR: ICD-10-CM

## 2021-06-30 PROCEDURE — 88305 TISSUE EXAM BY PATHOLOGIST: CPT | Performed by: PATHOLOGY

## 2021-06-30 PROCEDURE — 11102 TANGNTL BX SKIN SINGLE LES: CPT | Performed by: DERMATOLOGY

## 2021-06-30 PROCEDURE — 99213 OFFICE O/P EST LOW 20 MIN: CPT | Performed by: DERMATOLOGY

## 2021-06-30 NOTE — PROGRESS NOTES
[address]  [address] [facility] [address]  [phone]  [phone]     MRN: [id number] : 1945  Encounter: [id number]  Patient Information: Eleuterio Valentin  Chief complaint: 6 month check up    History of present illness:  57-year-old male presents for overall skin check previous history of skin cancer last skin cancer was 2 and half years ago no specific concerns or changes noted  Patient reports a mole left we I was concerned about today has been present for a long time he does not notice any change benign not recall have any information regarding mole in my chart  Past Medical History:   Diagnosis Date    Colon polyp     Diabetes ([address] )     Hyperlipemia     Hypertension      Past Surgical History:   Procedure Laterality Date    ELBOW SURGERY Left     PARATHYROIDECTOMY      TONSILECTOMY AND ADNOIDECTOMY      TONSILLECTOMY       Social History   Social History     Substance and Sexual Activity   Alcohol Use Yes    Alcohol/week: 2 0 standard drinks    Types: 1 Glasses of wine, 1 Cans of beer per week    Comment: occasionally      Social History     Substance and Sexual Activity   Drug Use No     Social History     Tobacco Use   Smoking Status Never Smoker   Smokeless Tobacco Never Used     Family History   Problem Relation Age of Onset    Hypertension Mother     Diabetes Mother     Leukemia Father      Meds/Allergies   Allergies   Allergen Reactions    Bee Venom      Yellow-jacket and hornets    Hornet Venom Hives    Olmesartan     Sulfa Antibiotics        Meds:  Prior to Admission medications    Medication Sig Start Date End Date Taking?  Authorizing Provider   amLODIPine (NORVASC) 2 5 mg tablet Take 10 mg by mouth daily    Yes Historical Provider, MD   metFORMIN (GLUCOPHAGE) 1000 MG tablet Take by mouth   Yes Historical Provider, MD   Multiple Vitamin (MULTI-VITAMIN DAILY) TABS Take by mouth   Yes Historical Provider, MD simvastatin (ZOCOR) 5 MG tablet Take 1 tablet by mouth daily 3/7/14  Yes Historical Provider, MD   fluocinonide (LIDEX) 0 05 % ointment Apply topically 2 (two) times a day To rash until resolved 1/18/21   Mariana Disla MD       Subjective:     Review of Systems:    General: negative for - chills, fatigue, fever,  weight gain or weight loss  Psychological: negative for - anxiety, behavioral disorder, concentration difficulties, decreased libido, depression, irritability, memory difficulties, mood swings, sleep disturbances or suicidal ideation  ENT: negative for - hearing difficulties , nasal congestion, nasal discharge, oral lesions, sinus pain, sneezing, sore throat  Allergy and Immunology: negative for - hives, insect bite sensitivity,  Hematological and Lymphatic: negative for - bleeding problems, blood clots,bruising, swollen lymph nodes  Endocrine: negative for - hair pattern changes, hot flashes, malaise/lethargy, mood swings, palpitations, polydipsia/polyuria, skin changes, temperature intolerance or unexpected weight change  Respiratory: negative for - cough, hemoptysis, orthopnea, shortness of breath, or wheezing  Cardiovascular: negative for - chest pain, dyspnea on exertion, edema,  Gastrointestinal: negative for - abdominal pain, nausea/vomiting  Genito-Urinary: negative for - dysuria, incontinence, irregular/heavy menses or urinary frequency/urgency  Musculoskeletal: negative for - gait disturbance, joint pain, joint stiffness, joint swelling, muscle pain, muscular weakness  Dermatological:  As in HPI  Neurological: negative for confusion, dizziness, headaches, impaired coordination/balance, memory loss, numbness/tingling, seizures, speech problems, tremors or weakness       Objective: There were no vitals taken for this visit      Physical Exam:    General Appearance:    Alert, cooperative, no distress   Head:    Normocephalic, without obvious abnormality, atraumatic           Skin:   A full skin exam was performed including scalp, head scalp, eyes, ears, nose, lips, neck, chest, axilla, abdomen, back, buttocks, bilateral upper extremities, bilateral lower extremities, hands, feet, fingers, toes, fingernails, and toenails 2 5 cm pigmented area on mid chest irregular borders erythema and some papular component  Normal pigmented lesion regular shape color normal keratotic papules greasy stuck appearance nothing else atypical noted on exam      Shave Biopsy Procedure Note    Pre-operative Diagnosis:  Congenital nevus rule out atypia    Plan:  1  Instructed to keep the wound dry and covered for 24 and clean thereafter  2  Warning signs of infection were reviewed  3  Recommended that the patient use OTC acetaminophen as needed for pain  4  Return  Pending results of biopsy(ies)    Locations mid chest    Indications:  Slightly atypical appearance    Anesthesia: Lidocaine 1% with epinephrine without added sodium bicarbonate    Procedure Details     Patient informed of the risks (including bleeding and infection) and benefits of the   procedure and Verbal informed consent obtained  The lesion and surrounding area were given a sterile prep using alcohol and draped in the usual sterile fashion  A Blue blade razor was used to obtain a specimen  Hemostasis achieved with aluminum chloride  Petrolatum and a sterile dressing applied  The specimen was sent for pathologic examination  The patient tolerated the procedure(s) well  Complications:  none  Assessment:     1  Congenital nevus     2  Seborrheic keratosis     3  Screening for skin condition     4   History of skin cancer           Plan:   Lesion on chest probably congenital nevus however clinically area looks a little suspicious so he like to go ahead take a sample  Seborrheic keratosis patient reassured these are normal growths we acquire with age no treatment needed  History of skin cancer in no recurrence nothing else atypical sunblock recommended follow-up in 6 months  Screening for dermatologic disorders nothing else of concern noted on complete exam follow-up in 6 months if no problems noted next visit will plan yearly visits after that    Idalia Elizabeth MD  6/30/2021,2:53 PM    Portions of the record may have been created with voice recognition software   Occasional wrong word or "sound a like" substitutions may have occurred due to the inherent limitations of voice recognition software   Read the chart carefully and recognize, using context, where substitutions have occurred

## 2021-06-30 NOTE — PATIENT INSTRUCTIONS
Lesion on chest probably congenital nevus however clinically area looks a little suspicious so he like to go ahead take a sample  Seborrheic keratosis patient reassured these are normal growths we acquire with age no treatment needed  History of skin cancer in no recurrence nothing else atypical sunblock recommended follow-up in 6 months  Screening for dermatologic disorders nothing else of concern noted on complete exam follow-up in 6 months if no problems noted next visit will plan yearly visits after that  Wound care instructions given to patient

## 2021-07-07 ENCOUNTER — TELEPHONE (OUTPATIENT)
Dept: DERMATOLOGY | Facility: CLINIC | Age: 76
End: 2021-07-07

## 2021-07-07 NOTE — TELEPHONE ENCOUNTER
----- Message from Jeffy Park MD sent at 7/7/2021  1:41 PM EDT -----  Please call him of normal pathology

## 2022-01-20 ENCOUNTER — OFFICE VISIT (OUTPATIENT)
Dept: DERMATOLOGY | Facility: CLINIC | Age: 77
End: 2022-01-20
Payer: MEDICARE

## 2022-01-20 VITALS — BODY MASS INDEX: 24.66 KG/M2 | WEIGHT: 167 LBS | TEMPERATURE: 97.9 F

## 2022-01-20 DIAGNOSIS — L98.9 UNKNOWN SKIN LESION: Primary | ICD-10-CM

## 2022-01-20 DIAGNOSIS — Z85.828 HISTORY OF SKIN CANCER: ICD-10-CM

## 2022-01-20 DIAGNOSIS — L82.1 SEBORRHEIC KERATOSIS: ICD-10-CM

## 2022-01-20 DIAGNOSIS — Z13.89 SCREENING FOR SKIN CONDITION: ICD-10-CM

## 2022-01-20 PROCEDURE — 99214 OFFICE O/P EST MOD 30 MIN: CPT | Performed by: DERMATOLOGY

## 2022-01-20 PROCEDURE — 11102 TANGNTL BX SKIN SINGLE LES: CPT | Performed by: DERMATOLOGY

## 2022-01-20 PROCEDURE — 88305 TISSUE EXAM BY PATHOLOGIST: CPT | Performed by: STUDENT IN AN ORGANIZED HEALTH CARE EDUCATION/TRAINING PROGRAM

## 2022-01-20 NOTE — PROGRESS NOTES
[address] [facility] DERMATOLOGY  [address] [address]  [phone]  [phone]     MRN: [id number] : 1945  Encounter: [id number]  Patient Information: Nomi Astorga  Chief complaint: 6 month check up    History of present illness:  70-year-old male presents for overall skin check previous history of skin cancers been 3 years since his last skin cancer no specific concerns noted not aware lesion we noted on his posterior neck  Past Medical History:   Diagnosis Date    Colon polyp     Diabetes ([address] )     Hyperlipemia     Hypertension      Past Surgical History:   Procedure Laterality Date    ELBOW SURGERY Left     PARATHYROIDECTOMY      TONSILECTOMY AND ADNOIDECTOMY      TONSILLECTOMY       Social History   Social History     Substance and Sexual Activity   Alcohol Use Yes    Alcohol/week: 2 0 standard drinks    Types: 1 Glasses of wine, 1 Cans of beer per week    Comment: occasionally      Social History     Substance and Sexual Activity   Drug Use No     Social History     Tobacco Use   Smoking Status Never Smoker   Smokeless Tobacco Never Used     Family History   Problem Relation Age of Onset    Hypertension Mother     Diabetes Mother     Leukemia Father      Meds/Allergies   Allergies   Allergen Reactions    Bee Venom      Yellow-jacket and hornets    Hornet Venom Hives    Olmesartan     Sulfa Antibiotics        Meds:  Prior to Admission medications    Medication Sig Start Date End Date Taking?  Authorizing Provider   amLODIPine (NORVASC) 2 5 mg tablet Take 10 mg by mouth daily    Yes Historical Provider, MD   metFORMIN (GLUCOPHAGE) 1000 MG tablet Take by mouth   Yes Historical Provider, MD   Multiple Vitamin (MULTI-VITAMIN DAILY) TABS Take by mouth   Yes Historical Provider, MD   simvastatin (ZOCOR) 5 MG tablet Take 1 tablet by mouth daily 3/7/14  Yes Historical Provider, MD   fluocinonide (LIDEX) 0 05 % ointment Apply topically 2 (two) times a day To rash until resolved 1/18/21   Karina Lewis MD       Subjective:     Review of Systems:    General: negative for - chills, fatigue, fever,  weight gain or weight loss  Psychological: negative for - anxiety, behavioral disorder, concentration difficulties, decreased libido, depression, irritability, memory difficulties, mood swings, sleep disturbances or suicidal ideation  ENT: negative for - hearing difficulties , nasal congestion, nasal discharge, oral lesions, sinus pain, sneezing, sore throat  Allergy and Immunology: negative for - hives, insect bite sensitivity,  Hematological and Lymphatic: negative for - bleeding problems, blood clots,bruising, swollen lymph nodes  Endocrine: negative for - hair pattern changes, hot flashes, malaise/lethargy, mood swings, palpitations, polydipsia/polyuria, skin changes, temperature intolerance or unexpected weight change  Respiratory: negative for - cough, hemoptysis, orthopnea, shortness of breath, or wheezing  Cardiovascular: negative for - chest pain, dyspnea on exertion, edema,  Gastrointestinal: negative for - abdominal pain, nausea/vomiting  Genito-Urinary: negative for - dysuria, incontinence, irregular/heavy menses or urinary frequency/urgency  Musculoskeletal: negative for - gait disturbance, joint pain, joint stiffness, joint swelling, muscle pain, muscular weakness  Dermatological:  As in HPI  Neurological: negative for confusion, dizziness, headaches, impaired coordination/balance, memory loss, numbness/tingling, seizures, speech problems, tremors or weakness       Objective:   Temp 97 9 °F (36 6 °C) (Temporal)   Wt 75 8 kg (167 lb)   BMI 24 66 kg/m²     Physical Exam:    General Appearance:    Alert, cooperative, no distress   Head:    Normocephalic, without obvious abnormality, atraumatic           Skin:   A full skin exam was performed including scalp, head scalp, eyes, ears, nose, lips, neck, chest, axilla, abdomen, back, buttocks, bilateral upper extremities, bilateral lower extremities, hands, feet, fingers, toes, fingernails, and toenails 3 mm pearly papule nape of neck normal keratotic papules with greasy stuck on appearance previous sites skin cancer well healed without recurrence nothing else atypical noted exam      Shave Biopsy Procedure Note    Pre-operative Diagnosis:  Basal cell carcinoma    Plan:  1  Instructed to keep the wound dry and covered for 24 and clean thereafter  2  Warning signs of infection were reviewed  3  Recommended that the patient use OTC acetaminophen as needed for pain  4  Return  Pending results of biopsy(ies)    Locations:  Nape of neck    Indications:  Suspicious lesion    Anesthesia: Lidocaine 1% with epinephrine without added sodium bicarbonate    Procedure Details     Patient informed of the risks (including bleeding and infection) and benefits of the   procedure and Verbal informed consent obtained  The lesion and surrounding area were given a sterile prep using alcohol and draped in the usual sterile fashion  A Blue blade razor was used to obtain a specimen  Hemostasis achieved with aluminum chloride  Petrolatum and a sterile dressing applied  The specimen was sent for pathologic examination  The patient tolerated the procedure(s) well  Complications:  none  Assessment:     1  Unknown skin lesion     2  Seborrheic keratosis     3  Screening for skin condition     4   History of skin cancer           Plan:   Skin lesion question basal cell carcinoma will require small excision if positive  Seborrheic keratosis patient reassured these are normal growths we acquire with age no treatment needed  History of skin cancer in no recurrence nothing else atypical sunblock recommended follow-up in 6 months  Screening for dermatologic disorders nothing else of concern noted on complete exam follow-up in 6 months    Flavio Zhang MD  1/20/2022,2:06 PM    Portions of the record may have been created with voice recognition software   Occasional wrong word or "sound a like" substitutions may have occurred due to the inherent limitations of voice recognition software   Read the chart carefully and recognize, using context, where substitutions have occurred

## 2022-01-20 NOTE — PATIENT INSTRUCTIONS
Skin lesion question basal cell carcinoma will require small excision if positive  Seborrheic keratosis patient reassured these are normal growths we acquire with age no treatment needed  History of skin cancer in no recurrence nothing else atypical sunblock recommended follow-up in 6 months  Screening for dermatologic disorders nothing else of concern noted on complete exam follow-up in 6 months  Wound care instructions given to patient

## 2022-03-01 ENCOUNTER — PROCEDURE VISIT (OUTPATIENT)
Dept: DERMATOLOGY | Facility: CLINIC | Age: 77
End: 2022-03-01
Payer: MEDICARE

## 2022-03-01 DIAGNOSIS — C44.41 BASAL CELL CARCINOMA (BCC) OF NECK: Primary | ICD-10-CM

## 2022-03-01 PROCEDURE — 17270 DSTR MAL LES S/N/H/F/G .5 /<: CPT | Performed by: DERMATOLOGY

## 2022-03-01 NOTE — PROGRESS NOTES
[address]  [facility] [address]  [phone]  [phone]     MRN: [id number] : 1945  Encounter: [id number]  Patient Information: Funmi Hoskins    Subjective:     73-year-old male presents for follow-up for planned removal of previously biopsied basal cell carcinoma posterior neck     Objective: There were no vitals taken for this visit  Physical Exam:    General Appearance:    Alert, cooperative, no distress   Skin:   Previous site of biopsy bearly notable at this time no induration  Procedure: Curettage & Electrodessication basal cell carcinoma  The reasons for the procedure were explained to the patient  The benefits and risks of the procedure were explained to the patient, including bleeding, infection, incomplete removal, prolonged anesthesia (weeks to months) and rarely nerve damage  The patient is aware that a scar will result from the procedure  The consent for the procedure was obtained verbally and in writing  Lesion Site:  Posterior neck Curetted Area Size (mm): 3    After alcohol prep 1% lidocaine with epinephrine anesthesia  Using a sterile curette, the appropriate area was curetted  The area was curetted and electrodesiccated x3  Final defect size: 3 5mm    The wound was left to heal by secondary intention  The wound was cleansed then covered with a dressing  Wound care instructions were verbally given and in writing  I performed the entire procedure  Patient tolerated procedure well  Assessment:     1  Basal cell carcinoma (BCC) of neck           Plan:   Wound care instructions given to patient        Prior to Admission medications    Medication Sig Start Date End Date Taking?  Authorizing Provider   amLODIPine (NORVASC) 2 5 mg tablet Take 10 mg by mouth daily    Yes Historical Provider, MD   metFORMIN (GLUCOPHAGE) 1000 MG tablet Take by mouth   Yes Historical Provider, MD   Multiple Vitamin (MULTI-VITAMIN DAILY) TABS Take by mouth   Yes Historical Provider, MD   simvastatin (ZOCOR) 5 MG tablet Take 1 tablet by mouth daily 3/7/14  Yes Historical Provider, MD   fluocinonide (LIDEX) 0 05 % ointment Apply topically 2 (two) times a day To rash until resolved 1/18/21   Joel Delcid MD     Allergies   Allergen Reactions    Bee Venom      Yellow-jacket and hornets   [employer] [place] Antibiotics        Joel Delcid MD  3/1/2022,10:00 AM    Portions of the record may have been created with voice recognition software   Occasional wrong word or "sound a like" substitutions may have occurred due to the inherent limitations of voice recognition software   Read the chart carefully and recognize, using context, where substitutions have occurred

## 2022-04-05 ENCOUNTER — CLINICAL SUPPORT (OUTPATIENT)
Dept: DERMATOLOGY | Facility: CLINIC | Age: 77
End: 2022-04-05
Payer: MEDICARE

## 2022-04-05 DIAGNOSIS — C44.41 BASAL CELL CARCINOMA (BCC) OF NECK: Primary | ICD-10-CM

## 2022-04-05 PROCEDURE — 99212 OFFICE O/P EST SF 10 MIN: CPT | Performed by: DERMATOLOGY

## 2022-04-05 NOTE — PROGRESS NOTES
[address]  [facility]  [address] [place] [address]  [phone]  [phone]     MRN: [id number] : 1945  Encounter: [id number]  Patient Information: Shaw Baez    Subjective:     70-year-old male presents for follow-up for previous curetted basal cell carcinoma posterior neck     Objective: There were no vitals taken for this visit  Physical Exam:    General Appearance:    Alert, cooperative, no distress   Skin:   Previous site of curettage well-healed without evidence of disease     Assessment:     1  Basal cell carcinoma (BCC) of neck           Plan:   Area well-healed without further treatment      Prior to Admission medications    Medication Sig Start Date End Date Taking? Authorizing Provider   amLODIPine (NORVASC) 2 5 mg tablet Take 10 mg by mouth daily     Historical Provider, MD   fluocinonide (LIDEX) 0 05 % ointment Apply topically 2 (two) times a day To rash until resolved 21   Luke Deras MD   metFORMIN (GLUCOPHAGE) 1000 MG tablet Take by mouth    Historical Provider, MD   Multiple Vitamin (MULTI-VITAMIN DAILY) TABS Take by mouth    Historical Provider, MD   simvastatin (ZOCOR) 5 MG tablet Take 1 tablet by mouth daily 3/7/14   Historical Provider, MD     Allergies   Allergen Reactions    Bee Venom      Yellow-jacket and hornets   [employer] Antibiotics        Luke Deras MD  2022,11:08 AM    Portions of the record may have been created with voice recognition software   Occasional wrong word or "sound a like" substitutions may have occurred due to the inherent limitations of voice recognition software   Read the chart carefully and recognize, using context, where substitutions have occurred

## 2022-05-13 ENCOUNTER — TELEPHONE (OUTPATIENT)
Dept: GASTROENTEROLOGY | Facility: CLINIC | Age: 77
End: 2022-05-13

## 2022-05-13 NOTE — TELEPHONE ENCOUNTER
Patient called left message to schedule colonoscopy 3yr recall  Returned his call left message to schedule office visit and to call back to schedule

## 2022-07-11 ENCOUNTER — OFFICE VISIT (OUTPATIENT)
Dept: GASTROENTEROLOGY | Facility: CLINIC | Age: 77
End: 2022-07-11
Payer: MEDICARE

## 2022-07-11 VITALS
HEIGHT: 69 IN | SYSTOLIC BLOOD PRESSURE: 146 MMHG | DIASTOLIC BLOOD PRESSURE: 86 MMHG | BODY MASS INDEX: 24.44 KG/M2 | HEART RATE: 78 BPM | WEIGHT: 165 LBS

## 2022-07-11 DIAGNOSIS — Z86.010 HISTORY OF COLON POLYPS: ICD-10-CM

## 2022-07-11 DIAGNOSIS — Z80.0 FAMILY HISTORY OF COLON CANCER: Primary | ICD-10-CM

## 2022-07-11 PROCEDURE — 99203 OFFICE O/P NEW LOW 30 MIN: CPT | Performed by: INTERNAL MEDICINE

## 2022-07-11 RX ORDER — AMLODIPINE BESYLATE 10 MG/1
10 TABLET ORAL DAILY
COMMUNITY
Start: 2022-04-13

## 2022-07-11 NOTE — LETTER
July 12, 2022     Elena Hernandez, [address] [place] [address]    Patient: Elgin Castellanos   YOB: 1945   Date of Visit: 7/11/2022       Dear Dr Juan Livingston:    Thank you for referring Bianca New to me for evaluation  Below are my notes for this consultation  If you have questions, please do not hesitate to call me  I look forward to following your patient along with you  Sincerely,        Phil Johnson DO        CC: No Recipients  Olga Lidia Emerson  7/11/2022  4:42 PM  Signed  [facility] - Outpatient Consultation  Elgin Castellanos 64 y o  male MRN: [id number]  Encounter: [id number]          ASSESSMENT AND PLAN:      1  Family history of colon cancer  - schedule colonoscopy     2  History of colon polyps  - schedule colonoscopy    ______________________________________________________________________    HPI:  This 75-year-old male comes the office today for the scheduling of her routine surveillance colonoscopy  There is a family history for colon cancer involving his paternal grandmother  The patient also has a history of colon polyps that were removed on May 10, 2019  There were 2 adenomatous polyps in 2 hyperplastic polyp  The patient currently denies any change in the bowel habits, abdominal pain, nausea, vomiting, diarrhea, constipation, rectal bleeding, hematemesis, melena, bloating, gaseousness  The patient denies any problems with previous colonoscopy to include anesthesia, the procedure itself, or the bowel prep  REVIEW OF SYSTEMS:    CONSTITUTIONAL: Denies any fever, chills, rigors, and weight loss  HEENT: No earache or tinnitus  Denies hearing loss or visual disturbances  CARDIOVASCULAR: No chest pain or palpitations  RESPIRATORY: Denies any cough, hemoptysis, shortness of breath or dyspnea on exertion  GASTROINTESTINAL: As noted in the History of Present Illness  GENITOURINARY: No problems with urination  Denies any hematuria or dysuria  NEUROLOGIC: No dizziness or vertigo, denies headaches  MUSCULOSKELETAL: Denies any muscle or joint pain  SKIN: Denies skin rashes or itching  ENDOCRINE: Denies excessive thirst  Denies intolerance to heat or cold  PSYCHOSOCIAL: Denies depression or anxiety  Denies any recent memory loss  Historical Information   Past Medical History:   Diagnosis Date    Colon polyp     Diabetes ([facility] [address] )     Hyperlipemia     Hypertension      Past Surgical History:   Procedure Laterality Date    ELBOW SURGERY Left     PARATHYROIDECTOMY      TONSILECTOMY AND ADNOIDECTOMY      TONSILLECTOMY       Social History   Social History     Substance and Sexual Activity   Alcohol Use Yes    Alcohol/week: 2 0 standard drinks    Types: 1 Glasses of wine, 1 Cans of beer per week    Comment: occasionally      Social History     Substance and Sexual Activity   Drug Use No     Social History     Tobacco Use   Smoking Status Never Smoker   Smokeless Tobacco Never Used     Family History   Problem Relation Age of Onset    Hypertension Mother     Diabetes Mother     Leukemia Father        Meds/Allergies       Current Outpatient Medications:     amLODIPine (NORVASC) 10 mg tablet    amLODIPine (NORVASC) 2 5 mg tablet    fluocinonide (LIDEX) 0 05 % ointment    metFORMIN (GLUCOPHAGE) 1000 MG tablet    metFORMIN (GLUCOPHAGE) 500 mg tablet    Multiple Vitamin (MULTI-VITAMIN DAILY) TABS    simvastatin (ZOCOR) 5 MG tablet    Allergies   Allergen Reactions    Bee Venom      Yellow-jacket and hornets    Hornet Venom Hives    Ace Inhibitors Cough    Olmesartan     Sulfa Antibiotics     Yellow Jacket Venom Other (See Comments)     Hives, sob           Objective     Blood pressure 146/86, pulse 78, height 5' 9" (1 753 m), weight 74 8 kg (165 lb)  Body mass index is 24 37 kg/m²          PHYSICAL EXAM:      General Appearance:   Alert, cooperative, no distress   HEENT:   Normocephalic, atraumatic, anicteric      Neck:  Supple, symmetrical, trachea midline   Lungs:   Clear to auscultation bilaterally; no rales, rhonchi or wheezing; respirations unlabored    Heart[de-identified]   Regular rate and rhythm; no murmur, rub, or gallop  Abdomen:   Soft, non-tender, non-distended; normal bowel sounds; no masses, no organomegaly    Genitalia:   Deferred    Rectal:   Deferred    Extremities:  No cyanosis, clubbing or edema    Pulses:  2+ and symmetric    Skin:  No jaundice, rashes, or lesions    Lymph nodes:  No palpable cervical lymphadenopathy        Lab Results:   No visits with results within 1 Day(s) from this visit  Latest known visit with results is:   Office Visit on 01/20/2022   Component Date Value    Case Report 01/20/2022                      Value:Surgical Pathology Report                         Case: [id number]                                   Authorizing Provider:  Merry Dukes MD          Collected:           01/20/2022 1410              Ordering Location:     [facility]           Received:            01/20/2022 [address] [place] [address] Dermatology                                                           Pathologist:           Vinayak Christianson MD                                                           Specimen:    Skin, Other, A) Nape of Neck                                                               Final Diagnosis 01/20/2022                      Value: This result contains rich text formatting which cannot be displayed here   Additional Information 01/20/2022                      Value: This result contains rich text formatting which cannot be displayed here  [facility] Gross Description 01/20/2022                      Value: This result contains rich text formatting which cannot be displayed here      Clinical Information 01/20/2022                      Value:Specimen A; Nape of Neck; Skin; Shave Biopsy; 64Year Old Male with Suspicious Lesion; Diff  Dx: Basal Cell Carcinoma    Attn: Derm Path         Radiology Results:   No results found

## 2022-07-11 NOTE — PROGRESS NOTES
[facility] - Outpatient Consultation  Renzo Santoyo 64 y o  male MRN: [id number]  Encounter: [id number]          ASSESSMENT AND PLAN:      1  Family history of colon cancer  - schedule colonoscopy     2  History of colon polyps  - schedule colonoscopy    ______________________________________________________________________    HPI:  This 64-year-old male comes the office today for the scheduling of her routine surveillance colonoscopy  There is a family history for colon cancer involving his paternal grandmother  The patient also has a history of colon polyps that were removed on May 10, 2019  There were 2 adenomatous polyps in 2 hyperplastic polyp  The patient currently denies any change in the bowel habits, abdominal pain, nausea, vomiting, diarrhea, constipation, rectal bleeding, hematemesis, melena, bloating, gaseousness  The patient denies any problems with previous colonoscopy to include anesthesia, the procedure itself, or the bowel prep  REVIEW OF SYSTEMS:    CONSTITUTIONAL: Denies any fever, chills, rigors, and weight loss  HEENT: No earache or tinnitus  Denies hearing loss or visual disturbances  CARDIOVASCULAR: No chest pain or palpitations  RESPIRATORY: Denies any cough, hemoptysis, shortness of breath or dyspnea on exertion  GASTROINTESTINAL: As noted in the History of Present Illness  GENITOURINARY: No problems with urination  Denies any hematuria or dysuria  NEUROLOGIC: No dizziness or vertigo, denies headaches  MUSCULOSKELETAL: Denies any muscle or joint pain  SKIN: Denies skin rashes or itching  ENDOCRINE: Denies excessive thirst  Denies intolerance to heat or cold  PSYCHOSOCIAL: Denies depression or anxiety  Denies any recent memory loss         Historical Information   Past Medical History:   Diagnosis Date    Colon polyp     Diabetes ([facility] [address] )     Hyperlipemia     Hypertension      Past Surgical History:   Procedure Laterality Date    ELBOW SURGERY Left     PARATHYROIDECTOMY      TONSILECTOMY AND ADNOIDECTOMY      TONSILLECTOMY       Social History   Social History     Substance and Sexual Activity   Alcohol Use Yes    Alcohol/week: 2 0 standard drinks    Types: 1 Glasses of wine, 1 Cans of beer per week    Comment: occasionally      Social History     Substance and Sexual Activity   Drug Use No     Social History     Tobacco Use   Smoking Status Never Smoker   Smokeless Tobacco Never Used     Family History   Problem Relation Age of Onset    Hypertension Mother     Diabetes Mother     Leukemia Father        Meds/Allergies       Current Outpatient Medications:     amLODIPine (NORVASC) 10 mg tablet    amLODIPine (NORVASC) 2 5 mg tablet    fluocinonide (LIDEX) 0 05 % ointment    metFORMIN (GLUCOPHAGE) 1000 MG tablet    metFORMIN (GLUCOPHAGE) 500 mg tablet    Multiple Vitamin (MULTI-VITAMIN DAILY) TABS    simvastatin (ZOCOR) 5 MG tablet    Allergies   Allergen Reactions    Bee Venom      Yellow-jacket and hornets    Hornet Venom Hives    Ace Inhibitors Cough    Olmesartan     Sulfa Antibiotics     Yellow Jacket Venom Other (See Comments)     Hives, sob           Objective     Blood pressure 146/86, pulse 78, height 5' 9" (1 753 m), weight 74 8 kg (165 lb)  Body mass index is 24 37 kg/m²  PHYSICAL EXAM:      General Appearance:   Alert, cooperative, no distress   HEENT:   Normocephalic, atraumatic, anicteric      Neck:  Supple, symmetrical, trachea midline   Lungs:   Clear to auscultation bilaterally; no rales, rhonchi or wheezing; respirations unlabored    Heart[de-identified]   Regular rate and rhythm; no murmur, rub, or gallop     Abdomen:   Soft, non-tender, non-distended; normal bowel sounds; no masses, no organomegaly    Genitalia:   Deferred    Rectal:   Deferred    Extremities:  No cyanosis, clubbing or edema    Pulses:  2+ and symmetric    Skin:  No jaundice, rashes, or lesions    Lymph nodes:  No palpable cervical lymphadenopathy  Lab Results:   No visits with results within 1 Day(s) from this visit  Latest known visit with results is:   Office Visit on 01/20/2022   Component Date Value    Case Report 01/20/2022                      Value:Surgical Pathology Report                         Case: [id number]                                   Authorizing Provider:  Ela Glass MD          Collected:           01/20/2022 1410              Ordering Location[facility]     [facility]           Received:            01/20/2022 [address] [place] [address] Dermatology                                                           Pathologist:           Mary Stover MD                                                           Specimen:    Skin, Other, A) Nape of Neck                                                               Final Diagnosis 01/20/2022                      Value: This result contains rich text formatting which cannot be displayed here   Additional Information 01/20/2022                      Value: This result contains rich text formatting which cannot be displayed here  [facility] Description 01/20/2022                      Value: This result contains rich text formatting which cannot be displayed here   Clinical Information 01/20/2022                      Value:Specimen A; Nape of Neck; Skin; Shave Biopsy; 64Year Old Male with Suspicious Lesion; Diff  Dx: Basal Cell Carcinoma    Attn: Derm Path         Radiology Results:   No results found

## 2022-07-12 ENCOUNTER — PREP FOR PROCEDURE (OUTPATIENT)
Dept: GASTROENTEROLOGY | Facility: CLINIC | Age: 77
End: 2022-07-12

## 2022-07-12 DIAGNOSIS — Z86.010 HISTORY OF COLON POLYPS: ICD-10-CM

## 2022-07-12 DIAGNOSIS — Z80.0 FAMILY HISTORY OF COLON CANCER: Primary | ICD-10-CM

## 2022-07-12 NOTE — PATIENT INSTRUCTIONS
Scheduled date of colonoscopy (as of today):9/22/22  Physician performing colonoscopy:Crys  Location of colonoscopy:[place]  Bowel prep reviewed with patient:miralax/dulcolax  Instructions reviewed with patient by:Dimple WEI  Clearances:  none

## 2022-08-01 ENCOUNTER — OFFICE VISIT (OUTPATIENT)
Dept: DERMATOLOGY | Facility: CLINIC | Age: 77
End: 2022-08-01
Payer: MEDICARE

## 2022-08-01 VITALS — BODY MASS INDEX: 24.44 KG/M2 | HEIGHT: 69 IN | WEIGHT: 165 LBS

## 2022-08-01 DIAGNOSIS — L82.1 SEBORRHEIC KERATOSIS: Primary | ICD-10-CM

## 2022-08-01 DIAGNOSIS — Z85.828 HISTORY OF SKIN CANCER: ICD-10-CM

## 2022-08-01 DIAGNOSIS — Z13.89 SCREENING FOR SKIN CONDITION: ICD-10-CM

## 2022-08-01 PROCEDURE — 99213 OFFICE O/P EST LOW 20 MIN: CPT | Performed by: DERMATOLOGY

## 2022-08-01 NOTE — PROGRESS NOTES
[address]  [address] [facility] [address]  [phone]  [phone]     MRN: [id number] : 1945  Encounter: [id number]  Patient Information: Timothy Sharma  Chief complaint: 6 month check up    History of present illness:  77-year-old male presents for overall skin check no specific concerns noted  Past Medical History:   Diagnosis Date    Colon polyp     Diabetes ([address] )     Hyperlipemia     Hypertension      Past Surgical History:   Procedure Laterality Date    ELBOW SURGERY Left     PARATHYROIDECTOMY      TONSILECTOMY AND ADNOIDECTOMY      TONSILLECTOMY       Social History   Social History     Substance and Sexual Activity   Alcohol Use Yes    Alcohol/week: 2 0 standard drinks    Types: 1 Glasses of wine, 1 Cans of beer per week    Comment: occasionally      Social History     Substance and Sexual Activity   Drug Use No     Social History     Tobacco Use   Smoking Status Never Smoker   Smokeless Tobacco Never Used     Family History   Problem Relation Age of Onset    Hypertension Mother     Diabetes Mother     Leukemia Father     Heart disease Brother      Meds/Allergies   Allergies   Allergen Reactions    Bee Venom      Yellow-jacket and hornets    Hornet Venom Hives    Ace Inhibitors Cough    Olmesartan     Sulfa Antibiotics     Yellow Jacket Venom Other (See Comments)     Hives, sob       Meds:  Prior to Admission medications    Medication Sig Start Date End Date Taking?  Authorizing Provider   amLODIPine (NORVASC) 10 mg tablet Take 10 mg by mouth daily 22  Yes Historical Provider, MD   metFORMIN (GLUCOPHAGE) 500 mg tablet Take 500 mg by mouth 2 (two) times a day with meals 22  Yes Historical Provider, MD   Multiple Vitamin (MULTI-VITAMIN DAILY) TABS Take by mouth   Yes Historical Provider, MD   simvastatin (ZOCOR) 5 MG tablet Take 1 tablet by mouth daily 3/7/14  Yes Historical Provider, MD   amLODIPine ([address]) 2 5 mg tablet Take 10 mg by mouth daily   Patient not taking: Reported on 8/1/2022    Historical Provider, MD   fluocinonide (LIDEX) 0 05 % ointment Apply topically 2 (two) times a day To rash until resolved  Patient not taking: Reported on 8/1/2022 1/18/21   Jane Cline MD   metFORMIN (GLUCOPHAGE) 1000 MG tablet Take by mouth  Patient not taking: Reported on 8/1/2022    Historical Provider, MD       Subjective:     Review of Systems:    General: negative for - chills, fatigue, fever,  weight gain or weight loss  Psychological: negative for - anxiety, behavioral disorder, concentration difficulties, decreased libido, depression, irritability, memory difficulties, mood swings, sleep disturbances or suicidal ideation  ENT: negative for - hearing difficulties , nasal congestion, nasal discharge, oral lesions, sinus pain, sneezing, sore throat  Allergy and Immunology: negative for - hives, insect bite sensitivity,  Hematological and Lymphatic: negative for - bleeding problems, blood clots,bruising, swollen lymph nodes  Endocrine: negative for - hair pattern changes, hot flashes, malaise/lethargy, mood swings, palpitations, polydipsia/polyuria, skin changes, temperature intolerance or unexpected weight change  Respiratory: negative for - cough, hemoptysis, orthopnea, shortness of breath, or wheezing  Cardiovascular: negative for - chest pain, dyspnea on exertion, edema,  Gastrointestinal: negative for - abdominal pain, nausea/vomiting  Genito-Urinary: negative for - dysuria, incontinence, irregular/heavy menses or urinary frequency/urgency  Musculoskeletal: negative for - gait disturbance, joint pain, joint stiffness, joint swelling, muscle pain, muscular weakness  Dermatological:  As in HPI  Neurological: negative for confusion, dizziness, headaches, impaired coordination/balance, memory loss, numbness/tingling, seizures, speech problems, tremors or weakness       Objective:   Ht 5' 9" (1 753 m)   Wt 74 8 kg (165 lb)   BMI 24 37 kg/m²     Physical Exam:    General Appearance:    Alert, cooperative, no distress   Head:    Normocephalic, without obvious abnormality, atraumatic           Skin:   A full skin exam was performed including scalp, head scalp, eyes, ears, nose, lips, neck, chest, axilla, abdomen, back, buttocks, bilateral upper extremities, bilateral lower extremities, hands, feet, fingers, toes, fingernails, and toenails normal keratotic papules greasy stuck appearance previous sites skin cancer well healed without recurrence nothing else atypical noted on exam     Assessment:     1  Seborrheic keratosis     2  Screening for skin condition     3  History of skin cancer           Plan:   Seborrheic keratosis patient reassured these are normal growths we acquire with age no treatment needed  History of skin cancer in no recurrence nothing else atypical sunblock recommended follow-up in 6 months  Screening for dermatologic disorders nothing else of concern noted on complete exam follow-up in 6 months    Cher Dang MD  8/1/2022,2:33 PM    Portions of the record may have been created with voice recognition software   Occasional wrong word or "sound a like" substitutions may have occurred due to the inherent limitations of voice recognition software   Read the chart carefully and recognize, using context, where substitutions have occurred

## 2022-09-21 RX ORDER — SODIUM CHLORIDE, SODIUM LACTATE, POTASSIUM CHLORIDE, CALCIUM CHLORIDE 600; 310; 30; 20 MG/100ML; MG/100ML; MG/100ML; MG/100ML
125 INJECTION, SOLUTION INTRAVENOUS CONTINUOUS
Status: CANCELLED | OUTPATIENT
Start: 2022-09-21

## 2022-09-21 RX ORDER — LIDOCAINE HYDROCHLORIDE 10 MG/ML
0.5 INJECTION, SOLUTION EPIDURAL; INFILTRATION; INTRACAUDAL; PERINEURAL ONCE AS NEEDED
Status: CANCELLED | OUTPATIENT
Start: 2022-09-21

## 2022-09-22 ENCOUNTER — ANESTHESIA EVENT (OUTPATIENT)
Dept: GASTROENTEROLOGY | Facility: HOSPITAL | Age: 77
End: 2022-09-22

## 2022-09-22 ENCOUNTER — HOSPITAL ENCOUNTER (OUTPATIENT)
Dept: GASTROENTEROLOGY | Facility: HOSPITAL | Age: 77
Setting detail: OUTPATIENT SURGERY
End: 2022-09-22
Attending: INTERNAL MEDICINE
Payer: MEDICARE

## 2022-09-22 ENCOUNTER — ANESTHESIA (OUTPATIENT)
Dept: GASTROENTEROLOGY | Facility: HOSPITAL | Age: 77
End: 2022-09-22

## 2022-09-22 VITALS
HEART RATE: 63 BPM | DIASTOLIC BLOOD PRESSURE: 79 MMHG | BODY MASS INDEX: 25.04 KG/M2 | WEIGHT: 169.09 LBS | RESPIRATION RATE: 22 BRPM | OXYGEN SATURATION: 94 % | SYSTOLIC BLOOD PRESSURE: 116 MMHG | TEMPERATURE: 97.4 F | HEIGHT: 69 IN

## 2022-09-22 DIAGNOSIS — Z86.010 HISTORY OF COLON POLYPS: ICD-10-CM

## 2022-09-22 DIAGNOSIS — Z80.0 FAMILY HISTORY OF COLON CANCER: ICD-10-CM

## 2022-09-22 LAB — GLUCOSE SERPL-MCNC: 111 MG/DL (ref 65–140)

## 2022-09-22 PROCEDURE — 88305 TISSUE EXAM BY PATHOLOGIST: CPT | Performed by: PATHOLOGY

## 2022-09-22 PROCEDURE — 45385 COLONOSCOPY W/LESION REMOVAL: CPT | Performed by: INTERNAL MEDICINE

## 2022-09-22 PROCEDURE — 82948 REAGENT STRIP/BLOOD GLUCOSE: CPT

## 2022-09-22 RX ORDER — ONDANSETRON 2 MG/ML
4 INJECTION INTRAMUSCULAR; INTRAVENOUS ONCE AS NEEDED
Status: CANCELLED | OUTPATIENT
Start: 2022-09-22

## 2022-09-22 RX ORDER — LIDOCAINE HYDROCHLORIDE 10 MG/ML
0.5 INJECTION, SOLUTION EPIDURAL; INFILTRATION; INTRACAUDAL; PERINEURAL ONCE AS NEEDED
Status: DISCONTINUED | OUTPATIENT
Start: 2022-09-22 | End: 2022-09-26 | Stop reason: HOSPADM

## 2022-09-22 RX ORDER — SODIUM CHLORIDE, SODIUM LACTATE, POTASSIUM CHLORIDE, CALCIUM CHLORIDE 600; 310; 30; 20 MG/100ML; MG/100ML; MG/100ML; MG/100ML
INJECTION, SOLUTION INTRAVENOUS CONTINUOUS PRN
Status: DISCONTINUED | OUTPATIENT
Start: 2022-09-22 | End: 2022-09-22

## 2022-09-22 RX ORDER — PROPOFOL 10 MG/ML
INJECTION, EMULSION INTRAVENOUS AS NEEDED
Status: DISCONTINUED | OUTPATIENT
Start: 2022-09-22 | End: 2022-09-22

## 2022-09-22 RX ORDER — SODIUM CHLORIDE, SODIUM LACTATE, POTASSIUM CHLORIDE, CALCIUM CHLORIDE 600; 310; 30; 20 MG/100ML; MG/100ML; MG/100ML; MG/100ML
125 INJECTION, SOLUTION INTRAVENOUS CONTINUOUS
Status: DISCONTINUED | OUTPATIENT
Start: 2022-09-22 | End: 2022-09-26 | Stop reason: HOSPADM

## 2022-09-22 RX ORDER — FENTANYL CITRATE/PF 50 MCG/ML
25 SYRINGE (ML) INJECTION
Status: CANCELLED | OUTPATIENT
Start: 2022-09-22

## 2022-09-22 RX ORDER — LIDOCAINE HYDROCHLORIDE 20 MG/ML
INJECTION, SOLUTION EPIDURAL; INFILTRATION; INTRACAUDAL; PERINEURAL AS NEEDED
Status: DISCONTINUED | OUTPATIENT
Start: 2022-09-22 | End: 2022-09-22

## 2022-09-22 RX ORDER — SODIUM CHLORIDE, SODIUM LACTATE, POTASSIUM CHLORIDE, CALCIUM CHLORIDE 600; 310; 30; 20 MG/100ML; MG/100ML; MG/100ML; MG/100ML
125 INJECTION, SOLUTION INTRAVENOUS CONTINUOUS
Status: CANCELLED | OUTPATIENT
Start: 2022-09-22

## 2022-09-22 RX ORDER — ALBUTEROL SULFATE 2.5 MG/3ML
2.5 SOLUTION RESPIRATORY (INHALATION) ONCE AS NEEDED
Status: CANCELLED | OUTPATIENT
Start: 2022-09-22

## 2022-09-22 RX ADMIN — PROPOFOL 20 MG: 10 INJECTION, EMULSION INTRAVENOUS at 08:36

## 2022-09-22 RX ADMIN — PROPOFOL 20 MG: 10 INJECTION, EMULSION INTRAVENOUS at 08:37

## 2022-09-22 RX ADMIN — PROPOFOL 20 MG: 10 INJECTION, EMULSION INTRAVENOUS at 08:28

## 2022-09-22 RX ADMIN — PROPOFOL 50 MG: 10 INJECTION, EMULSION INTRAVENOUS at 08:30

## 2022-09-22 RX ADMIN — LIDOCAINE HYDROCHLORIDE 50 MG: 20 INJECTION, SOLUTION EPIDURAL; INFILTRATION; INTRACAUDAL; PERINEURAL at 08:27

## 2022-09-22 RX ADMIN — PROPOFOL 20 MG: 10 INJECTION, EMULSION INTRAVENOUS at 08:31

## 2022-09-22 RX ADMIN — PROPOFOL 50 MG: 10 INJECTION, EMULSION INTRAVENOUS at 08:32

## 2022-09-22 RX ADMIN — PROPOFOL 80 MG: 10 INJECTION, EMULSION INTRAVENOUS at 08:27

## 2022-09-22 RX ADMIN — PROPOFOL 20 MG: 10 INJECTION, EMULSION INTRAVENOUS at 08:29

## 2022-09-22 RX ADMIN — PROPOFOL 20 MG: 10 INJECTION, EMULSION INTRAVENOUS at 08:34

## 2022-09-22 RX ADMIN — PROPOFOL 20 MG: 10 INJECTION, EMULSION INTRAVENOUS at 08:35

## 2022-09-22 RX ADMIN — SODIUM CHLORIDE, SODIUM LACTATE, POTASSIUM CHLORIDE, AND CALCIUM CHLORIDE: .6; .31; .03; .02 INJECTION, SOLUTION INTRAVENOUS at 07:49

## 2022-09-22 NOTE — ANESTHESIA POSTPROCEDURE EVALUATION
Post-Op Assessment Note    CV Status:  Stable  Pain Score: 0    Pain management: adequate     Mental Status:  Sleepy   Hydration Status:  Euvolemic   PONV Controlled:  Controlled   Airway Patency:  Patent      Post Op Vitals Reviewed: Yes      Staff: Anesthesiologist, CRNA         No complications documented      BP (!) 85/52 (09/22/22 0843)    Temp      Pulse 66 (09/22/22 0843)   Resp 12 (09/22/22 0843)    SpO2 97 % (09/22/22 0843)

## 2022-09-22 NOTE — ANESTHESIA PREPROCEDURE EVALUATION
Procedure:  COLONOSCOPY    A1C 6 6    Relevant Problems   CARDIO   (+) Hypertension      NEURO/PSYCH   (+) History of skin cancer        Physical Exam    Airway    Mallampati score: III  TM Distance: >3 FB  Neck ROM: full     Dental   No notable dental hx     Cardiovascular  Rhythm: regular, Rate: normal, Cardiovascular exam normal    Pulmonary  Pulmonary exam normal Breath sounds clear to auscultation,     Other Findings        Anesthesia Plan  ASA Score- 2     Anesthesia Type- IV sedation with anesthesia with ASA Monitors  Additional Monitors:   Airway Plan:     Comment: Discussed risks/benefits, including medication reactions, awareness, aspiration, and serious/life threatening complications  Plan to maintain native airway with IVGA, monitored with EtCO2  Plan Factors-Exercise tolerance (METS): >4 METS  Patient summary reviewed  Patient instructed to abstain from smoking on day of procedure  Patient did not smoke on day of surgery  Induction- intravenous  Postoperative Plan-     Informed Consent- Anesthetic plan and risks discussed with patient  I personally reviewed this patient with the CRNA  Discussed and agreed on the Anesthesia Plan with the CRNA  Judy Triana

## 2022-09-22 NOTE — H&P
History and Physical -  [facility]  Val Aburto 64 y o  male MRN: [id number]      HPI: Val Aburto is a 64y o  year old male who presents for the a positive family history colon cancer and a personal history of colon polyps      REVIEW OF SYSTEMS: Per the HPI, and otherwise unremarkable  Historical Information   Past Medical History:   Diagnosis Date    Colon polyp     Diabetes ([address] )     Hyperlipemia     Hypertension      Past Surgical History:   Procedure Laterality Date    ELBOW SURGERY Left     PARATHYROIDECTOMY      TONSILECTOMY AND ADNOIDECTOMY      TONSILLECTOMY       Social History   Social History     Substance and Sexual Activity   Alcohol Use Yes    Alcohol/week: 2 0 standard drinks    Types: 1 Glasses of wine, 1 Cans of beer per week    Comment: occasionally      Social History     Substance and Sexual Activity   Drug Use No     Social History     Tobacco Use   Smoking Status Never Smoker   Smokeless Tobacco Never Used     Family History   Problem Relation Age of Onset    Hypertension Mother     Diabetes Mother     Leukemia Father     Heart disease Brother        Meds/Allergies     (Not in a hospital admission)      Allergies   Allergen Reactions    Bee Venom      Yellow-jacket and hornets    Hornet Venom Hives    Ace Inhibitors Cough    Olmesartan     Sulfa Antibiotics     Yellow Jacket Venom Other (See Comments)     Hives, sob       Objective     Blood pressure 135/87, pulse 72, temperature 98 °F (36 7 °C), temperature source Temporal, resp  rate 18, height 5' 9" (1 753 m), weight 76 7 kg (169 lb 1 5 oz), SpO2 96 %  PHYSICAL EXAM    Gen: NAD  CV: RRR  CHEST: Clear  ABD: soft, NT/ND  EXT: no edema      ASSESSMENT/PLAN:  This is a 64y o  year old male here for colonoscopy, and he is stable and optimized for his procedure

## 2022-09-27 PROCEDURE — 88305 TISSUE EXAM BY PATHOLOGIST: CPT | Performed by: PATHOLOGY

## 2022-09-30 ENCOUNTER — TELEPHONE (OUTPATIENT)
Dept: GASTROENTEROLOGY | Facility: CLINIC | Age: 77
End: 2022-09-30

## 2022-09-30 NOTE — TELEPHONE ENCOUNTER
----- Message from Sindhu Leach DO sent at 9/30/2022  8:14 AM EDT -----  Please call the patient with the polyp results  The polyp was a hyperplastic or benign polyp and completely removed    The patient is not due for any additional colonoscopy is based on his age

## 2023-03-06 ENCOUNTER — OFFICE VISIT (OUTPATIENT)
Dept: DERMATOLOGY | Facility: CLINIC | Age: 78
End: 2023-03-06

## 2023-03-06 VITALS — HEIGHT: 69 IN | BODY MASS INDEX: 24.73 KG/M2 | WEIGHT: 167 LBS

## 2023-03-06 DIAGNOSIS — Z85.828 HISTORY OF SKIN CANCER: ICD-10-CM

## 2023-03-06 DIAGNOSIS — L82.1 SEBORRHEIC KERATOSIS: Primary | ICD-10-CM

## 2023-03-06 DIAGNOSIS — Z13.89 SCREENING FOR SKIN CONDITION: ICD-10-CM

## 2023-03-06 NOTE — PROGRESS NOTES
[facility] DERMATOLOGY  [address] [place] [address]  Allison Clark [address] [address]  [phone]  [phone]     MRN: [id number] : 1945  Encounter: [id number]  Patient Information: Rafa Galdamez  Chief complaint: 6 month check up    History of present illness: 49-year-old male presents for planned checkup no specific concerns noted  Past Medical History:   Diagnosis Date   • Colon polyp    • Diabetes ([address] )    • Hyperlipemia    • Hypertension      Past Surgical History:   Procedure Laterality Date   • ELBOW SURGERY Left    • PARATHYROIDECTOMY     • TONSILECTOMY AND ADNOIDECTOMY     • TONSILLECTOMY       Social History   Social History     Substance and Sexual Activity   Alcohol Use Yes   • Alcohol/week: 2 0 standard drinks   • Types: 1 Glasses of wine, 1 Cans of beer per week    Comment: occasionally      Social History     Substance and Sexual Activity   Drug Use No     Social History     Tobacco Use   Smoking Status Never   Smokeless Tobacco Never     Family History   Problem Relation Age of Onset   • Hypertension Mother    • Diabetes Mother    • Leukemia Father    • Heart disease Brother      Meds/Allergies   Allergies   Allergen Reactions   • Bee Venom      Yellow-jacket and hornets   • Hornet Venom Hives   • Ace Inhibitors Cough   • Olmesartan    • Sulfa Antibiotics    • Yellow Jacket Venom Other (See Comments)     Hives, sob       Meds:  Prior to Admission medications    Medication Sig Start Date End Date Taking?  Authorizing Provider   amLODIPine (NORVASC) 10 mg tablet Take 10 mg by mouth daily 22  Yes Historical Provider, MD   metFORMIN (GLUCOPHAGE) 500 mg tablet Take 500 mg by mouth 2 (two) times a day with meals 22  Yes Historical Provider, MD   Multiple Vitamin (MULTI-VITAMIN DAILY) TABS Take by mouth   Yes Historical Provider, MD   simvastatin (ZOCOR) 5 MG tablet Take 1 tablet by mouth daily 3/7/14  Yes Historical Provider, MD   amLODIPine (NORVASC) 2 5 mg tablet Take 10 mg by mouth daily   Patient not taking: Reported on 8/1/2022    Historical Provider, MD   fluocinonide (LIDEX) 0 05 % ointment Apply topically 2 (two) times a day To rash until resolved  Patient not taking: Reported on 8/1/2022 1/18/21   Elicia Avitia MD   metFORMIN (GLUCOPHAGE) 1000 MG tablet Take by mouth  Patient not taking: Reported on 8/1/2022    Historical Provider, MD       Subjective:     Review of Systems:    General: negative for - chills, fatigue, fever,  weight gain or weight loss  Psychological: negative for - anxiety, behavioral disorder, concentration difficulties, decreased libido, depression, irritability, memory difficulties, mood swings, sleep disturbances or suicidal ideation  ENT: negative for - hearing difficulties , nasal congestion, nasal discharge, oral lesions, sinus pain, sneezing, sore throat  Allergy and Immunology: negative for - hives, insect bite sensitivity,  Hematological and Lymphatic: negative for - bleeding problems, blood clots,bruising, swollen lymph nodes  Endocrine: negative for - hair pattern changes, hot flashes, malaise/lethargy, mood swings, palpitations, polydipsia/polyuria, skin changes, temperature intolerance or unexpected weight change  Respiratory: negative for - cough, hemoptysis, orthopnea, shortness of breath, or wheezing  Cardiovascular: negative for - chest pain, dyspnea on exertion, edema,  Gastrointestinal: negative for - abdominal pain, nausea/vomiting  Genito-Urinary: negative for - dysuria, incontinence, irregular/heavy menses or urinary frequency/urgency  Musculoskeletal: negative for - gait disturbance, joint pain, joint stiffness, joint swelling, muscle pain, muscular weakness  Dermatological:  As in HPI  Neurological: negative for confusion, dizziness, headaches, impaired coordination/balance, memory loss, numbness/tingling, seizures, speech problems, tremors or weakness       Objective:   Ht 5' 9" (1 753 m)   Wt 75 8 kg (167 lb)   BMI 24 66 kg/m²     Physical Exam:    General Appearance:    Alert, cooperative, no distress   Head:    Normocephalic, without obvious abnormality, atraumatic           Skin:   A full skin exam was performed including scalp, head scalp, eyes, ears, nose, lips, neck, chest, axilla, abdomen, back, buttocks, bilateral upper extremities, bilateral lower extremities, hands, feet, fingers, toes, fingernails, and toenails previous site of skin cancer well-healed without recurrence normal keratotic papules with greasy stuck on appearance nothing else atypical noted on complete exam     Assessment:     1  Seborrheic keratosis        2  History of skin cancer        3  Screening for skin condition              Plan:   Seborrheic keratosis patient reassured these are normal growths we acquire with age no treatment needed  History of skin cancer in no recurrence nothing else atypical sunblock recommended follow-up in 6 months  Screening for dermatologic disorders nothing else of concern noted on complete exam follow-up in 6 months    Cuong Bragg MD  3/6/2023,2:29 PM    Portions of the record may have been created with voice recognition software   Occasional wrong word or "sound a like" substitutions may have occurred due to the inherent limitations of voice recognition software   Read the chart carefully and recognize, using context, where substitutions have occurred

## 2023-03-06 NOTE — PROGRESS NOTES
[place] [address] [facility] Note     Patient Name: Charlie Wang  Encounter Date: March 6, 2023     Have you been cared for by a [place] [address] Dermatologist in the last 3 years and, if so, which description applies to you? Yes  I have been here within the last 3 years, and my medical history has NOT changed since that time  I am MALE/not capable of bearing children  REVIEW OF SYSTEMS:  Have you recently had or currently have any of the following? · No changes in my recent health  PAST MEDICAL HISTORY:  Have you personally ever had or currently have any of the following? If "YES," then please provide more detail  · No changes in my medical history  FAMILY HISTORY:  Any "first degree relatives" (parent, brother, sister, or child) with the following? • No changes in my family's known health  PATIENT EXPERIENCE:    • Do you want the Dermatologist to perform a COMPLETE skin exam today including a clinical examination under the "bra and underwear" areas? Yes  • If necessary, do we have your permission to call and leave a detailed message on your Preferred Phone number that includes your specific medical information?   Yes      Allergies   Allergen Reactions   • Bee Venom      Yellow-jacket and hornets   • Hornet Venom Hives   • Ace Inhibitors Cough   • Olmesartan    • Sulfa Antibiotics    • Yellow Jacket Venom Other (See Comments)     Hives, sob      Current Outpatient Medications:   •  amLODIPine (NORVASC) 10 mg tablet, Take 10 mg by mouth daily, Disp: , Rfl:   •  amLODIPine (NORVASC) 2 5 mg tablet, Take 10 mg by mouth daily  (Patient not taking: Reported on 8/1/2022), Disp: , Rfl:   •  fluocinonide (LIDEX) 0 05 % ointment, Apply topically 2 (two) times a day To rash until resolved (Patient not taking: Reported on 8/1/2022), Disp: 30 g, Rfl: 1  •  metFORMIN (GLUCOPHAGE) 1000 MG tablet, Take by mouth (Patient not taking: Reported on 8/1/2022), Disp: , Rfl:   •  metFORMIN (GLUCOPHAGE) 500 mg tablet, Take 500 mg by mouth 2 (two) times a day with meals, Disp: , Rfl:   •  Multiple Vitamin (MULTI-VITAMIN DAILY) TABS, Take by mouth, Disp: , Rfl:   •  simvastatin (ZOCOR) 5 MG tablet, Take 1 tablet by mouth daily, Disp: , Rfl:           • Whom besides the patient is providing clinical information about today's encounter?   o NO ADDITIONAL HISTORIAN (patient alone provided history)    Physical Exam and Assessment/Plan by Diagnosis:

## 2023-09-14 ENCOUNTER — OFFICE VISIT (OUTPATIENT)
Age: 78
End: 2023-09-14
Payer: MEDICARE

## 2023-09-14 VITALS — BODY MASS INDEX: 24.91 KG/M2 | HEIGHT: 69 IN | TEMPERATURE: 98.1 F | WEIGHT: 168.2 LBS

## 2023-09-14 DIAGNOSIS — L81.4 LENTIGINES: ICD-10-CM

## 2023-09-14 DIAGNOSIS — Z85.828 HISTORY OF BASAL CELL CARCINOMA: Primary | ICD-10-CM

## 2023-09-14 DIAGNOSIS — D22.9 MULTIPLE MELANOCYTIC NEVI: ICD-10-CM

## 2023-09-14 DIAGNOSIS — L82.1 SEBORRHEIC KERATOSIS: ICD-10-CM

## 2023-09-14 DIAGNOSIS — D18.01 CHERRY ANGIOMA: ICD-10-CM

## 2023-09-14 PROCEDURE — 99213 OFFICE O/P EST LOW 20 MIN: CPT | Performed by: DERMATOLOGY

## 2023-09-14 NOTE — PROGRESS NOTES
[facility] Dermatology Clinic Note     Patient Name: Marine Jackson  Encounter Date: 9/14/23     Have you been cared for by a [facility] Dermatologist in the last 3 years and, if so, which description applies to you? Yes. I have been here within the last 3 years, and my medical history has NOT changed since that time. I am MALE/not capable of bearing children. REVIEW OF SYSTEMS:  Have you recently had or currently have any of the following? · No changes in my recent health. PAST MEDICAL HISTORY:  Have you personally ever had or currently have any of the following? If "YES," then please provide more detail. · No changes in my medical history. FAMILY HISTORY:  Any "first degree relatives" (parent, brother, sister, or child) with the following? • No changes in my family's known health. PATIENT EXPERIENCE:    • Do you want the Dermatologist to perform a COMPLETE skin exam today including a clinical examination under the "bra and underwear" areas? Yes  • If necessary, do we have your permission to call and leave a detailed message on your Preferred Phone number that includes your specific medical information?   Yes      Allergies   Allergen Reactions   • Bee Venom      Yellow-jacket and hornets   • Hornet Venom Hives   • Ace Inhibitors Cough   • Olmesartan    • Sulfa Antibiotics    • Yellow Jacket Venom Other (See Comments)     Hives, sob      Current Outpatient Medications:   •  amLODIPine (NORVASC) 10 mg tablet, Take 10 mg by mouth daily, Disp: , Rfl:   •  metFORMIN (GLUCOPHAGE) 500 mg tablet, Take 500 mg by mouth 2 (two) times a day with meals, Disp: , Rfl:   •  Multiple Vitamin (MULTI-VITAMIN DAILY) TABS, Take by mouth, Disp: , Rfl:   •  simvastatin (ZOCOR) 5 MG tablet, Take 1 tablet by mouth daily, Disp: , Rfl:   •  amLODIPine (NORVASC) 2.5 mg tablet, Take 10 mg by mouth daily  (Patient not taking: Reported on 8/1/2022), Disp: , Rfl:   •  fluocinonide (LIDEX) 0.05 % ointment, Apply topically 2 (two) times a day To rash until resolved (Patient not taking: Reported on 8/1/2022), Disp: 30 g, Rfl: 1  •  metFORMIN (GLUCOPHAGE) 1000 MG tablet, Take by mouth (Patient not taking: Reported on 8/1/2022), Disp: , Rfl:           • Whom besides the patient is providing clinical information about today's encounter?   o NO ADDITIONAL HISTORIAN (patient alone provided history)    Physical Exam and Assessment/Plan by Diagnosis:    HISTORY OF BASAL CELL CARCINOMA    Physical Exam:  • Anatomic Location Affected:  Nape of neck  • Morphological Description of scar:  Well healed scar  • Suspected Recurrence: No  • Pertinent Positives:  • Pertinent Negatives: Additional History of Present Condition:  History of basal cell carcinoma with no sign of recurrence, treated in 1/20/2022    Assessment and Plan:  Based on a thorough discussion of this condition and the management approach to it (including a comprehensive discussion of the known risks, side effects and potential benefits of treatment), the patient (family) agrees to implement the following specific plan:  • Assured no reoccurrence     How can basal cell carcinoma be prevented? The most important way to prevent BCC is to avoid sunburn. This is especially important in childhood and early life. Fair skinned individuals and those with a personal or family history of BCC should protect their skin from sun exposure daily, year-round and lifelong. • Stay indoors or under the shade in the middle of the day   • Wear covering clothing   • Apply high protection factor SPF50+ broad-spectrum sunscreens generously to exposed skin if outdoors   • Avoid indoor tanning (sun beds, solaria)  • Oral nicotinamide (vitamin B3) in a dose of 500 mg twice daily may reduce the number and severity of BCCs. What is the outlook for basal cell carcinoma? Most BCCs are cured by treatment. Cure is most likely if treatment is undertaken when the lesion is small.   About 50% of people with [facility] develop a second one within 3 years of the first. They are also at increased risk of other skin cancers, especially melanoma. Regular self-skin examinations and long-term annual skin checks by an experienced health professional are recommended. SEBORRHEIC KERATOSES  - Relevant exam: Scattered over the trunk/extremities are waxy brown to black plaques and papules with stuck on appearance and consistent dermoscopy  - Exam and clinical history consistent with seborrheic keratoses  - Counseled that these are benign growths that do not require treatment  - Counseled that removal of lesions is considered cosmetic and so would incur a fee should patient elect to move forward. MELANOCYTIC NEVI  -Relevant exam: Scattered over the trunk/extremities are homogenously pigmented brown macules and papules. ELM performed and without concerning findings. No outliers unless otherwise noted in today's note  - Exam and clinical history consistent with melanocytic nevi  - Educated on the ABCDE's of melanoma; handout provided  - Counseled to return to clinic prior to scheduled appointment should any of these lesions change or should any new lesions of concern arise  - Counseled on use of sun protection daily. Reviewed latest FDA sunscreen guidelines, including use of broad spectrum (UVA and UVB blocking) sunscreen or sun protective clothing with SPF 30-50 every 2-3 hours and reapplied after exposure to water; use of photoprotective clothing, including a broad brim hat and UPF rated clothing if outdoors for several hours; avoid use of tanning beds as these pose significant risk for melanoma and skin cancer. LENTIGINES  OTHER SKIN CHANGES DUE TO CHRONIC EXPOSURE TO NONIONIZING RADIATION  - Relevant exam: Over sun exposed areas are brown macules. ELM performed and without concerning findings. - Exam and clinical history consistent with lentigines. - Educated that these are indicative of prior sun exposure.    - Counseled to return to clinic prior to scheduled appointment should any of these lesions change or should any new lesions of concern arise.  - Recommended use of sunscreen as above and below. - Counseled on use of sun protection daily. Reviewed latest FDA sunscreen guidelines, including use of broad spectrum (UVA and UVB blocking) sunscreen or sun protective clothing with SPF 30-50 every 2-3 hours and reapplied after exposure to water; use of photoprotective clothing, including a broad brim hat and UPF rated clothing if outdoors for several hours; avoid use of tanning beds as these pose significant risk for melanoma and skin cancer. CHERRY ANGIOMAS  - Relevant exam: Scattered over the trunk/extremities are red papules  - Exam and clinical history consistent with cherry angiomas  - Educated that these are benign  - Educated that removal is considered aesthetic and would incur a fee.           Scribe Attestation    I,:  Krystle Martines am acting as a scribe while in the presence of the attending physician.:       I,:  Cristal Harris MD personally performed the services described in this documentation    as scribed in my presence.:

## 2023-10-05 ENCOUNTER — TELEPHONE (OUTPATIENT)
Age: 78
End: 2023-10-05

## 2024-02-21 PROBLEM — Z13.89 SCREENING FOR SKIN CONDITION: Status: RESOLVED | Noted: 2018-05-21 | Resolved: 2024-02-21

## 2025-07-28 ENCOUNTER — OFFICE VISIT (OUTPATIENT)
Age: 80
End: 2025-07-28
Payer: MEDICARE

## 2025-07-28 VITALS
DIASTOLIC BLOOD PRESSURE: 80 MMHG | HEIGHT: 69 IN | WEIGHT: 172 LBS | BODY MASS INDEX: 25.48 KG/M2 | HEART RATE: 104 BPM | TEMPERATURE: 97.9 F | SYSTOLIC BLOOD PRESSURE: 120 MMHG | OXYGEN SATURATION: 98 %

## 2025-07-28 DIAGNOSIS — L82.1 SEBORRHEIC KERATOSES: ICD-10-CM

## 2025-07-28 DIAGNOSIS — D48.5 NEOPLASM OF UNCERTAIN BEHAVIOR OF SKIN: Primary | ICD-10-CM

## 2025-07-28 DIAGNOSIS — D22.9 MULTIPLE MELANOCYTIC NEVI: ICD-10-CM

## 2025-07-28 PROCEDURE — 88342 IMHCHEM/IMCYTCHM 1ST ANTB: CPT | Performed by: STUDENT IN AN ORGANIZED HEALTH CARE EDUCATION/TRAINING PROGRAM

## 2025-07-28 PROCEDURE — 11102 TANGNTL BX SKIN SINGLE LES: CPT

## 2025-07-28 PROCEDURE — 99214 OFFICE O/P EST MOD 30 MIN: CPT

## 2025-07-28 PROCEDURE — 88305 TISSUE EXAM BY PATHOLOGIST: CPT | Performed by: STUDENT IN AN ORGANIZED HEALTH CARE EDUCATION/TRAINING PROGRAM

## 2025-08-01 ENCOUNTER — TELEPHONE (OUTPATIENT)
Age: 80
End: 2025-08-01

## 2025-08-01 PROCEDURE — 88305 TISSUE EXAM BY PATHOLOGIST: CPT | Performed by: STUDENT IN AN ORGANIZED HEALTH CARE EDUCATION/TRAINING PROGRAM

## 2025-08-01 PROCEDURE — 88342 IMHCHEM/IMCYTCHM 1ST ANTB: CPT | Performed by: STUDENT IN AN ORGANIZED HEALTH CARE EDUCATION/TRAINING PROGRAM

## 2025-08-19 ENCOUNTER — OFFICE VISIT (OUTPATIENT)
Age: 80
End: 2025-08-19

## 2025-08-19 VITALS
HEIGHT: 69 IN | TEMPERATURE: 97.5 F | DIASTOLIC BLOOD PRESSURE: 72 MMHG | BODY MASS INDEX: 24.88 KG/M2 | RESPIRATION RATE: 17 BRPM | WEIGHT: 168 LBS | OXYGEN SATURATION: 98 % | SYSTOLIC BLOOD PRESSURE: 122 MMHG | HEART RATE: 98 BPM

## 2025-08-19 DIAGNOSIS — L82.1 SEBORRHEIC KERATOSIS: ICD-10-CM

## 2025-08-19 DIAGNOSIS — Z41.1 ENCOUNTER FOR COSMETIC PROCEDURE: Primary | ICD-10-CM
